# Patient Record
Sex: FEMALE | Race: WHITE | NOT HISPANIC OR LATINO | Employment: OTHER | URBAN - METROPOLITAN AREA
[De-identification: names, ages, dates, MRNs, and addresses within clinical notes are randomized per-mention and may not be internally consistent; named-entity substitution may affect disease eponyms.]

---

## 2017-02-24 ENCOUNTER — APPOINTMENT (OUTPATIENT)
Dept: PREADMISSION TESTING | Facility: HOSPITAL | Age: 67
End: 2017-02-24

## 2017-02-24 VITALS
WEIGHT: 190.6 LBS | OXYGEN SATURATION: 95 % | HEIGHT: 61 IN | BODY MASS INDEX: 35.98 KG/M2 | RESPIRATION RATE: 16 BRPM | HEART RATE: 76 BPM | DIASTOLIC BLOOD PRESSURE: 76 MMHG | SYSTOLIC BLOOD PRESSURE: 170 MMHG

## 2017-02-24 LAB
ANION GAP SERPL CALCULATED.3IONS-SCNC: 17.3 MMOL/L
BACTERIA UR QL AUTO: ABNORMAL /HPF
BILIRUB UR QL STRIP: NEGATIVE
BUN BLD-MCNC: 22 MG/DL (ref 8–23)
BUN/CREAT SERPL: 21 (ref 7–25)
CALCIUM SPEC-SCNC: 9.2 MG/DL (ref 8.8–10.5)
CHLORIDE SERPL-SCNC: 95 MMOL/L (ref 98–107)
CLARITY UR: CLEAR
CO2 SERPL-SCNC: 24.7 MMOL/L (ref 22–29)
COLOR UR: YELLOW
CREAT BLD-MCNC: 1.05 MG/DL (ref 0.57–1)
DEPRECATED RDW RBC AUTO: 45.2 FL (ref 37–54)
ERYTHROCYTE [DISTWIDTH] IN BLOOD BY AUTOMATED COUNT: 15.3 % (ref 11.5–14.5)
GFR SERPL CREATININE-BSD FRML MDRD: 52 ML/MIN/1.73
GLUCOSE BLD-MCNC: 219 MG/DL (ref 65–99)
GLUCOSE UR STRIP-MCNC: NEGATIVE MG/DL
HCT VFR BLD AUTO: 39 % (ref 37–47)
HGB BLD-MCNC: 11.7 G/DL (ref 12–16)
HGB UR QL STRIP.AUTO: ABNORMAL
HYALINE CASTS UR QL AUTO: ABNORMAL /LPF
KETONES UR QL STRIP: NEGATIVE
LEUKOCYTE ESTERASE UR QL STRIP.AUTO: ABNORMAL
MCH RBC QN AUTO: 24.5 PG (ref 27–31)
MCHC RBC AUTO-ENTMCNC: 30 G/DL (ref 31–37)
MCV RBC AUTO: 81.8 FL (ref 81–99)
NITRITE UR QL STRIP: NEGATIVE
PH UR STRIP.AUTO: 6 [PH] (ref 4.5–8)
PLATELET # BLD AUTO: 316 10*3/MM3 (ref 140–500)
PMV BLD AUTO: 9.7 FL (ref 7.4–10.4)
POTASSIUM BLD-SCNC: 4.6 MMOL/L (ref 3.5–5.2)
PROT UR QL STRIP: NEGATIVE
RBC # BLD AUTO: 4.77 10*6/MM3 (ref 4.2–5.4)
RBC # UR: ABNORMAL /HPF
REF LAB TEST METHOD: ABNORMAL
SODIUM BLD-SCNC: 137 MMOL/L (ref 136–145)
SP GR UR STRIP: 1.01 (ref 1–1.03)
SQUAMOUS #/AREA URNS HPF: ABNORMAL /HPF
UROBILINOGEN UR QL STRIP: ABNORMAL
WBC NRBC COR # BLD: 16.14 10*3/MM3 (ref 4.8–10.8)
WBC UR QL AUTO: ABNORMAL /HPF

## 2017-02-24 PROCEDURE — 81001 URINALYSIS AUTO W/SCOPE: CPT | Performed by: UROLOGY

## 2017-02-24 PROCEDURE — 87086 URINE CULTURE/COLONY COUNT: CPT | Performed by: UROLOGY

## 2017-02-24 PROCEDURE — 93010 ELECTROCARDIOGRAM REPORT: CPT | Performed by: INTERNAL MEDICINE

## 2017-02-24 PROCEDURE — 36415 COLL VENOUS BLD VENIPUNCTURE: CPT

## 2017-02-24 PROCEDURE — 85027 COMPLETE CBC AUTOMATED: CPT | Performed by: UROLOGY

## 2017-02-24 PROCEDURE — 93005 ELECTROCARDIOGRAM TRACING: CPT

## 2017-02-24 PROCEDURE — 80048 BASIC METABOLIC PNL TOTAL CA: CPT | Performed by: UROLOGY

## 2017-02-24 RX ORDER — CLOPIDOGREL BISULFATE 75 MG/1
75 TABLET ORAL DAILY
COMMUNITY

## 2017-02-24 NOTE — DISCHARGE INSTRUCTIONS
PRE-ADMISSION TESTING INSTRUCTIONS FOR ADULTS    Take your Metoprolol the morning of surgery with a small sip of water.    Stop your aspirin and plavix per Dr Dyson's instructions.  No advil, aleve, ibuprofen, naproxen, diet pills, decongestants, or herbal/vitamins for a week prior to surgery.    General Instructions:    • Do not eat or drink after midnight: includes water, mints, or gum. You may brush your teeth.  • Do not smoke, chew tobacco, or drink alcohol.  • Bring medications in original bottles, any inhalers and if applicable your C-PAP/ BI-PAP machine.  • Wear clean comfortable clothes and socks.  • Do not wear contact lenses, lotion, deodorant, or make-up.  Bring a case for your glasses if applicable.   • Bring crutches or walker if applicable.  • Leave all other valuables and jewelry at home.      Preventing a Surgical Site Infection:    • Shower the night before and on the morning of surgery using the chlorhexidine soap you were given.  Use a clean washcloth with the soap. Do not use the CHG soap on your hair, face, or private areas. Wash your body gently for five (5) minutes. Do not scrub your skin too hard.  Dry with a clean towel and dress in clean clothing.    • Do not shave the surgical area for a week prior to surgery  because the razor can irritate skin and make it easier to develop an infection.    • Make sure you, your family, and all healthcare providers clean their hands with soap and water or an alcohol based hand  before caring for you or your wound.    • If at all possible, quit smoking as many days before surgery as you can.    Day of surgery:    Your surgeon’s office will advise you of your arrival time for the day of surgery.    Upon arrival, a Pre-op nurse and Anesthesia provider will review your health history, obtain vital signs, and answer questions you may have.  The only belongings needed at this time will be your home medications and if applicable your C-PAP/BI-PAP  machine.  If you are staying overnight your family can leave the rest of your belongings in the car and bring them to your room later.  A Pre-op nurse will start an IV and you may receive medication in preparation for surgery, including something to help you relax.  Your family will be able to see you in the Pre-op area.  While you are in surgery your family should notify the waiting room  if they leave the waiting room area and provide a contact phone number.    IF you have any questions, you can call the Pre-Admission Department at [phone] or your surgeon's office.    Please be aware that surgery does come with discomfort.  We want to make every effort to control your discomfort so please discuss any uncontrolled symptoms with your nurse.   Your doctor will most likely have prescribed pain medications.      If you are going home after surgery you will receive individualized written care instructions before being discharged.  A responsible adult must drive you to and from the hospital on the day of your surgery and stay with you for 24 hours.    If you are staying overnight following surgery, you will be transported to your hospital room following the recovery period.  [facility] has all private rooms.    Deductibles and co-payments are collected on the day of service. Please be prepared to pay the required co-pay, deductible or deposit on the day of service as defined by your plan.      Transurethral Resection, Bladder Tumor  A cancerous growth (tumor) can develop on the inside wall of the bladder. The bladder is the organ that holds urine. One way to remove the tumor is a procedure called a transurethral resection. The tumor is removed (resected) through the tube that carries urine from the bladder out of the body (urethra). No cuts (incisions) are made in the skin. Instead, the procedure is done through a thin telescope, called a resectoscope. Attached to it is a light and usually  a tiny camera. The resectoscope is put into the urethra. In men, the urethra opens at the end of the penis. In women, it opens just above the vagina.   A transurethral resection is usually used to remove tumors that have not gotten too big or too deep. These are called Stage 0, Stage 1 or Stage 2 bladder cancers.  LET YOUR CAREGIVER KNOW ABOUT:   On the day of the procedure, your caregivers will need to know the last time you had anything to eat or drink. This includes water, gum, and candy. In advance, make sure they know about:   · Any allergies.  · All medications you are taking, including:    Herbs, eyedrops, over-the-counter medications and creams.    Blood thinners (anticoagulants), aspirin or other drugs that could affect blood clotting.  · Use of steroids (by mouth or as creams).  · Previous problems with anesthetics, including local anesthetics.  · Possibility of pregnancy, if this applies.  · Any history of blood clots.  · Any history of bleeding or other blood problems.  · Previous surgery.  · Smoking history.  · Any recent symptoms of colds or infections.  · Other health problems.  RISKS AND COMPLICATIONS  This is usually a safe procedure. Every procedure has risks, though. For a transurethral resection, they include:  · Infection. Antibiotic medication would need to be taken.  · Bleeding.    Light bleeding may last for several days after the procedure.    If bleeding continues or is heavy, the bladder may need rinsing. Or, a new catheter might be put in for awhile.    Sometimes bed rest is needed.  · Urination problems.    Pain and burning can occur when urinating. This usually goes away in a few days.    Scarring from the procedure can block the flow of urine.  · Bladder damage.    It can be punctured or torn during removal of the tumor. If this happens, a catheter might be needed for longer. Antibiotics would be taken while the bladder heals.    Urine can leak through the hole or tear into the  abdomen. If this happens, surgery may be needed to repair the bladder.  BEFORE THE PROCEDURE   · A medical evaluation will be done. This may include:    A physical examination.    Urine test. This is to make sure you do not have a urinary tract infection.    Blood tests.    A test that checks the heart's rhythm (electrocardiogram).    Talking with an anesthesiologist. This is the person who will be in charge of the medication (anesthesia) to keep you from feeling pain during the transurethral resection. You might be asleep during the procedure (general anesthesia) or numb from the waist down, but awake during the procedure (spinal anesthesia). Ask your surgeon what to expect.  · The person who is having a transurethral resection needs to give what is called informed consent. This requires signing a legal paper that gives permission for the procedure. To give informed consent:    You must understand how the procedure is done and why.    You must be told all the risks and benefits of the procedure.    You must sign the consent. Sometimes a legal guardian can do this.    Signing should be witnessed by a healthcare professional.  · The day before the surgery, eat only a light dinner. Then, do not eat or drink anything for at least 8 hours before the surgery. Ask your caregiver if it is OK to take any needed medicines with a sip of water.  · Arrive at least an hour before the surgery or whenever your surgeon recommends. This will give you time to check in and fill out any needed paperwork.  PROCEDURE  · The preparation:    You will change into a hospital gown.    A needle will be inserted in your arm. This is an intravenous access tube (IV). Medication will be able to flow directly into your body through this needle.    Small monitors will be put on your body. They are used to check your heart, blood pressure, and oxygen level.    You might be given medication that will help you relax (sedative).    You will be given a  general anesthetic or spinal anesthesia.  · The procedure:    Once you are asleep or numb from the waist down, your legs will be placed in stirrups.    The resectoscope will be passed through the urethra into the bladder.    Fluid will be passed through the resectoscope. This will fill the bladder with water.    The surgeon will examine the bladder through the scope. If the scope has a camera, it can take pictures from inside the bladder. They can be projected onto a TV screen.    The surgeon will use various tools to remove the tumor in small pieces. Sometimes a laser (a beam of light energy) is used. Other tools may use electric current.    A tube (catheter) will often be placed so that urine can drain into a bag outside the body. This process helps stop bleeding. This tube keeps blood clots from blocking the urethra.    The procedure usually takes 30 to 45 minutes.  AFTER THE PROCEDURE   · You will stay in a recovery area until the anesthesia has worn off. Your blood pressure and pulse will be checked every so often. Then you will be taken to a hospital room.  · You may continue to get fluids through the IV for awhile.  · Some pain is normal. The catheter might be uncomfortable. Pain is usually not severe. If it is, ask for pain medicine.  · Your urine may look bloody after a transurethral resection. This is normal.  ¨ If bleeding is heavy, a hospital caregiver may rinse out the bladder (irrigation) through the catheter.  ¨ Once the urine is clear, the catheter will be taken out.  ¨ You will need to stay in the hospital until you can urinate on your own.  · This can be an outpatient or inpatient procedure. Discuss this with your physician.  PROGNOSIS   · Transurethral resection is considered the best way to treat bladder tumors that are not too far along. For most people, the treatment is successful. Sometimes, though, more treatment is needed.  · Bladder cancers can come back even after a successful procedure.  Because of this, be sure to have a checkup with your caregiver every 3 to 6 months. If everything is OK for 3 years, you can reduce the checkups to once a year.     This information is not intended to replace advice given to you by your health care provider. Make sure you discuss any questions you have with your health care provider.     Document Released: 10/14/2010 Document Revised: 03/11/2013 Document Reviewed: 01/27/2015  [employer]® Patient Information ©2016 [employer].

## 2017-02-24 NOTE — PAT
Pt and daughter here for PAT visit.  Pre-op tests completed, chg soap given, and instructions reviewed.  Will need accu-check and potassium day of surgery. Follows w/Dr Marrero for cardiology, clearance for TURB in 6/2016.

## 2017-02-26 LAB — BACTERIA SPEC AEROBE CULT: ABNORMAL

## 2017-04-04 ENCOUNTER — LAB (OUTPATIENT)
Dept: LAB | Facility: HOSPITAL | Age: 67
End: 2017-04-04
Attending: UROLOGY

## 2017-04-04 ENCOUNTER — TRANSCRIBE ORDERS (OUTPATIENT)
Dept: ADMINISTRATIVE | Facility: HOSPITAL | Age: 67
End: 2017-04-04

## 2017-04-04 DIAGNOSIS — D49.4 BLADDER TUMOR: ICD-10-CM

## 2017-04-04 DIAGNOSIS — Z01.818 PRE-OP TESTING: ICD-10-CM

## 2017-04-04 DIAGNOSIS — Z01.818 PRE-OP TESTING: Primary | ICD-10-CM

## 2017-04-04 LAB
BACTERIA UR QL AUTO: ABNORMAL /HPF
BILIRUB UR QL STRIP: NEGATIVE
CLARITY UR: CLEAR
COLOR UR: YELLOW
GLUCOSE UR STRIP-MCNC: NEGATIVE MG/DL
HGB UR QL STRIP.AUTO: ABNORMAL
HYALINE CASTS UR QL AUTO: ABNORMAL /LPF
KETONES UR QL STRIP: NEGATIVE
LEUKOCYTE ESTERASE UR QL STRIP.AUTO: ABNORMAL
NITRITE UR QL STRIP: NEGATIVE
PH UR STRIP.AUTO: 6.5 [PH] (ref 4.5–8)
PROT UR QL STRIP: NEGATIVE
RBC # UR: ABNORMAL /HPF
REF LAB TEST METHOD: ABNORMAL
SP GR UR STRIP: 1.01 (ref 1–1.03)
SQUAMOUS #/AREA URNS HPF: ABNORMAL /HPF
UROBILINOGEN UR QL STRIP: ABNORMAL
WBC UR QL AUTO: ABNORMAL /HPF

## 2017-04-04 PROCEDURE — 81001 URINALYSIS AUTO W/SCOPE: CPT

## 2017-04-07 NOTE — PAT
History updated by phone.  Will need labs dos per anesthesia guidelines.  States has rash that comes intermittently, low grade fever with it;  PCP told ?shingles or scabies, pt trying to get in with dermatology.  Instructed to let Dr Dyson know if has a fever. Reviewed w/CW, CRNA; not a concern for surgery.

## 2017-04-13 ENCOUNTER — ANESTHESIA EVENT (OUTPATIENT)
Dept: PERIOP | Facility: HOSPITAL | Age: 67
End: 2017-04-13

## 2017-04-14 ENCOUNTER — ANESTHESIA (OUTPATIENT)
Dept: PERIOP | Facility: HOSPITAL | Age: 67
End: 2017-04-14

## 2017-04-14 ENCOUNTER — HOSPITAL ENCOUNTER (OUTPATIENT)
Facility: HOSPITAL | Age: 67
Setting detail: HOSPITAL OUTPATIENT SURGERY
Discharge: HOME | End: 2017-04-14
Attending: UROLOGY | Admitting: UROLOGY

## 2017-04-14 VITALS
OXYGEN SATURATION: 98 % | HEART RATE: 60 BPM | TEMPERATURE: 98 F | DIASTOLIC BLOOD PRESSURE: 90 MMHG | WEIGHT: 190 LBS | RESPIRATION RATE: 15 BRPM | BODY MASS INDEX: 34.96 KG/M2 | SYSTOLIC BLOOD PRESSURE: 185 MMHG | HEIGHT: 62 IN

## 2017-04-14 DIAGNOSIS — C67.4 CANCER OF POSTERIOR WALL OF URINARY BLADDER (HCC): ICD-10-CM

## 2017-04-14 PROBLEM — C67.9 BLADDER CANCER: Status: ACTIVE | Noted: 2017-04-14

## 2017-04-14 LAB
BACTERIA UR QL AUTO: ABNORMAL /HPF
BILIRUB UR QL STRIP: NEGATIVE
CLARITY UR: CLEAR
COLOR UR: YELLOW
GLUCOSE BLDC GLUCOMTR-MCNC: 74 MG/DL (ref 70–130)
GLUCOSE BLDC GLUCOMTR-MCNC: 77 MG/DL (ref 70–130)
GLUCOSE UR STRIP-MCNC: NEGATIVE MG/DL
HGB UR QL STRIP.AUTO: ABNORMAL
HYALINE CASTS UR QL AUTO: ABNORMAL /LPF
KETONES UR QL STRIP: NEGATIVE
LEUKOCYTE ESTERASE UR QL STRIP.AUTO: ABNORMAL
NITRITE UR QL STRIP: NEGATIVE
PH UR STRIP.AUTO: 7 [PH] (ref 4.5–8)
POTASSIUM BLD-SCNC: 4.1 MMOL/L (ref 3.5–5.2)
PROT UR QL STRIP: NEGATIVE
RBC # UR: ABNORMAL /HPF
REF LAB TEST METHOD: ABNORMAL
SP GR UR STRIP: 1.01 (ref 1–1.03)
SQUAMOUS #/AREA URNS HPF: ABNORMAL /HPF
UROBILINOGEN UR QL STRIP: ABNORMAL
WBC UR QL AUTO: ABNORMAL /HPF

## 2017-04-14 PROCEDURE — 25010000002 FENTANYL CITRATE (PF) 100 MCG/2ML SOLUTION: Performed by: NURSE ANESTHETIST, CERTIFIED REGISTERED

## 2017-04-14 PROCEDURE — 25010000002 ONDANSETRON PER 1 MG: Performed by: NURSE ANESTHETIST, CERTIFIED REGISTERED

## 2017-04-14 PROCEDURE — 81001 URINALYSIS AUTO W/SCOPE: CPT | Performed by: UROLOGY

## 2017-04-14 PROCEDURE — 87086 URINE CULTURE/COLONY COUNT: CPT | Performed by: UROLOGY

## 2017-04-14 PROCEDURE — 82962 GLUCOSE BLOOD TEST: CPT

## 2017-04-14 PROCEDURE — 25010000002 PROPOFOL 10 MG/ML EMULSION: Performed by: NURSE ANESTHETIST, CERTIFIED REGISTERED

## 2017-04-14 PROCEDURE — 84132 ASSAY OF SERUM POTASSIUM: CPT | Performed by: NURSE ANESTHETIST, CERTIFIED REGISTERED

## 2017-04-14 PROCEDURE — 25010000002 MIDAZOLAM PER 1 MG: Performed by: NURSE ANESTHETIST, CERTIFIED REGISTERED

## 2017-04-14 PROCEDURE — 25010000003 CEFAZOLIN PER 500 MG

## 2017-04-14 RX ORDER — LIDOCAINE HYDROCHLORIDE 20 MG/ML
INJECTION, SOLUTION INFILTRATION; PERINEURAL AS NEEDED
Status: DISCONTINUED | OUTPATIENT
Start: 2017-04-14 | End: 2017-04-14 | Stop reason: SURG

## 2017-04-14 RX ORDER — SODIUM CHLORIDE, SODIUM LACTATE, POTASSIUM CHLORIDE, CALCIUM CHLORIDE 600; 310; 30; 20 MG/100ML; MG/100ML; MG/100ML; MG/100ML
9 INJECTION, SOLUTION INTRAVENOUS CONTINUOUS
Status: DISCONTINUED | OUTPATIENT
Start: 2017-04-14 | End: 2017-04-14 | Stop reason: HOSPADM

## 2017-04-14 RX ORDER — SODIUM CHLORIDE 0.9 % (FLUSH) 0.9 %
3 SYRINGE (ML) INJECTION AS NEEDED
Status: DISCONTINUED | OUTPATIENT
Start: 2017-04-14 | End: 2017-04-14 | Stop reason: HOSPADM

## 2017-04-14 RX ORDER — MIDAZOLAM HYDROCHLORIDE 1 MG/ML
0.5 INJECTION INTRAMUSCULAR; INTRAVENOUS
Status: DISCONTINUED | OUTPATIENT
Start: 2017-04-14 | End: 2017-04-14 | Stop reason: HOSPADM

## 2017-04-14 RX ORDER — SODIUM CHLORIDE 0.9 % (FLUSH) 0.9 %
1-10 SYRINGE (ML) INJECTION AS NEEDED
Status: DISCONTINUED | OUTPATIENT
Start: 2017-04-14 | End: 2017-04-14 | Stop reason: HOSPADM

## 2017-04-14 RX ORDER — MIDAZOLAM HYDROCHLORIDE 1 MG/ML
2 INJECTION INTRAMUSCULAR; INTRAVENOUS
Status: DISCONTINUED | OUTPATIENT
Start: 2017-04-14 | End: 2017-04-14 | Stop reason: HOSPADM

## 2017-04-14 RX ORDER — ONDANSETRON 2 MG/ML
4 INJECTION INTRAMUSCULAR; INTRAVENOUS ONCE AS NEEDED
Status: COMPLETED | OUTPATIENT
Start: 2017-04-14 | End: 2017-04-14

## 2017-04-14 RX ORDER — PHENAZOPYRIDINE HYDROCHLORIDE 100 MG/1
200 TABLET, FILM COATED ORAL ONCE
Status: COMPLETED | OUTPATIENT
Start: 2017-04-14 | End: 2017-04-14

## 2017-04-14 RX ORDER — SODIUM CHLORIDE, SODIUM LACTATE, POTASSIUM CHLORIDE, CALCIUM CHLORIDE 600; 310; 30; 20 MG/100ML; MG/100ML; MG/100ML; MG/100ML
1000 INJECTION, SOLUTION INTRAVENOUS CONTINUOUS PRN
Status: DISCONTINUED | OUTPATIENT
Start: 2017-04-14 | End: 2017-04-14 | Stop reason: HOSPADM

## 2017-04-14 RX ORDER — HYDROCODONE BITARTRATE AND ACETAMINOPHEN 7.5; 325 MG/1; MG/1
1 TABLET ORAL ONCE AS NEEDED
Status: DISCONTINUED | OUTPATIENT
Start: 2017-04-14 | End: 2017-04-14 | Stop reason: HOSPADM

## 2017-04-14 RX ORDER — MEPERIDINE HYDROCHLORIDE 25 MG/ML
12.5 INJECTION INTRAMUSCULAR; INTRAVENOUS; SUBCUTANEOUS
Status: DISCONTINUED | OUTPATIENT
Start: 2017-04-14 | End: 2017-04-14 | Stop reason: HOSPADM

## 2017-04-14 RX ORDER — MAGNESIUM HYDROXIDE 1200 MG/15ML
LIQUID ORAL AS NEEDED
Status: DISCONTINUED | OUTPATIENT
Start: 2017-04-14 | End: 2017-04-14 | Stop reason: HOSPADM

## 2017-04-14 RX ORDER — ONDANSETRON 2 MG/ML
4 INJECTION INTRAMUSCULAR; INTRAVENOUS ONCE AS NEEDED
Status: DISCONTINUED | OUTPATIENT
Start: 2017-04-14 | End: 2017-04-14 | Stop reason: HOSPADM

## 2017-04-14 RX ORDER — LIDOCAINE HYDROCHLORIDE 10 MG/ML
0.5 INJECTION, SOLUTION INFILTRATION; PERINEURAL ONCE AS NEEDED
Status: COMPLETED | OUTPATIENT
Start: 2017-04-14 | End: 2017-04-14

## 2017-04-14 RX ORDER — PROPOFOL 10 MG/ML
VIAL (ML) INTRAVENOUS AS NEEDED
Status: DISCONTINUED | OUTPATIENT
Start: 2017-04-14 | End: 2017-04-14 | Stop reason: SURG

## 2017-04-14 RX ORDER — FAMOTIDINE 20 MG/1
20 TABLET, FILM COATED ORAL
Status: DISCONTINUED | OUTPATIENT
Start: 2017-04-14 | End: 2017-04-14 | Stop reason: HOSPADM

## 2017-04-14 RX ORDER — FENTANYL CITRATE 50 UG/ML
INJECTION, SOLUTION INTRAMUSCULAR; INTRAVENOUS AS NEEDED
Status: DISCONTINUED | OUTPATIENT
Start: 2017-04-14 | End: 2017-04-14 | Stop reason: SURG

## 2017-04-14 RX ORDER — FAMOTIDINE 10 MG/ML
20 INJECTION, SOLUTION INTRAVENOUS
Status: DISCONTINUED | OUTPATIENT
Start: 2017-04-14 | End: 2017-04-14 | Stop reason: HOSPADM

## 2017-04-14 RX ORDER — LIDOCAINE HYDROCHLORIDE 10 MG/ML
0.3 INJECTION, SOLUTION EPIDURAL; INFILTRATION; INTRACAUDAL; PERINEURAL ONCE
Status: COMPLETED | OUTPATIENT
Start: 2017-04-14 | End: 2017-04-14

## 2017-04-14 RX ADMIN — PROPOFOL 100 MG: 10 INJECTION, EMULSION INTRAVENOUS at 07:40

## 2017-04-14 RX ADMIN — LIDOCAINE HYDROCHLORIDE 100 MG: 20 INJECTION, SOLUTION INFILTRATION; PERINEURAL at 07:40

## 2017-04-14 RX ADMIN — LIDOCAINE HYDROCHLORIDE 0.5 ML: 10 INJECTION, SOLUTION EPIDURAL; INFILTRATION; INTRACAUDAL; PERINEURAL at 07:29

## 2017-04-14 RX ADMIN — FAMOTIDINE 20 MG: 10 INJECTION, SOLUTION INTRAVENOUS at 07:18

## 2017-04-14 RX ADMIN — HYDROCODONE BITARTRATE AND ACETAMINOPHEN 1 TABLET: 7.5; 325 TABLET ORAL at 09:05

## 2017-04-14 RX ADMIN — FENTANYL CITRATE 50 MCG: 50 INJECTION, SOLUTION INTRAMUSCULAR; INTRAVENOUS at 07:40

## 2017-04-14 RX ADMIN — ONDANSETRON 4 MG: 2 INJECTION, SOLUTION INTRAMUSCULAR; INTRAVENOUS at 07:18

## 2017-04-14 RX ADMIN — PHENAZOPYRIDINE HYDROCHLORIDE 200 MG: 100 TABLET ORAL at 08:51

## 2017-04-14 RX ADMIN — LIDOCAINE HYDROCHLORIDE 0.3 ML: 10 INJECTION, SOLUTION EPIDURAL; INFILTRATION; INTRACAUDAL; PERINEURAL at 06:20

## 2017-04-14 RX ADMIN — MIDAZOLAM HYDROCHLORIDE 0.5 MG: 1 INJECTION, SOLUTION INTRAMUSCULAR; INTRAVENOUS at 07:24

## 2017-04-14 RX ADMIN — SODIUM CHLORIDE, POTASSIUM CHLORIDE, SODIUM LACTATE AND CALCIUM CHLORIDE 9 ML/HR: 600; 310; 30; 20 INJECTION, SOLUTION INTRAVENOUS at 06:20

## 2017-04-14 RX ADMIN — CEFAZOLIN SODIUM 2 G: 2 SOLUTION INTRAVENOUS at 07:39

## 2017-04-14 NOTE — PLAN OF CARE
Problem: Patient Care Overview (Adult)  Goal: Plan of Care Review  Outcome: Ongoing (interventions implemented as appropriate)    04/14/17 0903   Coping/Psychosocial Response Interventions   Plan Of Care Reviewed With patient   Patient Care Overview   Progress improving   Outcome Evaluation   Outcome Summary/Follow up Plan vss, waiting to go home

## 2017-04-14 NOTE — BRIEF OP NOTE
CYSTOSCOPY TRANSURETHRAL RESECTION OF BLADDER TUMOR  Procedure Note    Arianna Smith  4/14/2017    Pre-op Diagnosis: recurrent bladder cancer post mitomycin c    C67.4    Post-op Diagnosis:   Same   * No Diagnosis Codes entered *POSTERIOR WALL TRIGONE     Procedure/CPT® Codes:      Procedure(s):  CYSTOSCOPY TRANSURETHRAL RESECTION OF BLADDER TUMOR SMALL     Surgeon(s):  Seun Dyson MD    Anesthesia: General    Staff:   Circulator: Asia Moy RN  Scrub Person: Nani Ayala    Estimated Blood Loss: * No values recorded between 4/14/2017 12:00 AM and 4/14/2017  7:29 AM *  Urine Voided: * No values recorded between 4/14/2017 12:00 AM and 4/14/2017  7:29 AM *    Specimens:                * No specimens in log *      Drains:           Findings: BETTER  POST WALL  CALCIFIED POST [facility]   SUPERFICIAL TRIGONE     Complications:      Seun Dyson MD     Date: 4/14/2017  Time: 7:29 AM

## 2017-04-14 NOTE — PLAN OF CARE
Problem: Perioperative Period (Adult)  Goal: Signs and Symptoms of Listed Potential Problems Will be Absent or Manageable (Perioperative Period)  Outcome: Ongoing (interventions implemented as appropriate)    04/14/17 0903   Perioperative Period   Problems Assessed (Perioperative Period) all   Problems Present (Perioperative Period) pain

## 2017-04-14 NOTE — H&P
HISTORY OF PRESENT ILLNESS: The patient has a history of recurrent bladder cancer post mitomycin-C x3, to undergo cystoscopy and bladder biopsies. She has had no gross hematuria or irritative bladder complaints. She has a chronic cystocele that is causing no symptoms and has been followed conservatively.      HOME MEDICATIONS:   1.  Aspirin.    2.  Furosemide.    3.  Losartan.    4.  Metformin.    5.  Metoprolol.    6.  Novolin.    7.  Plavix which had been stopped preoperatively.    8.  Spironolactone.      ALLERGIES:   1.  SURGERY TAPE.     2.  TERRAMYCIN.     3.  PERCOCET.    4.  STATINS.     5.  IODINE.      PAST MEDICAL HISTORY:   1.  Arthritis.   2.  COPD.    3.  Coronary artery disease.    4.  Degenerative disk disease.    5.  Depression.    6.  Diabetes.    7.  Hiatal hernia.    8.  Hypercholesterolemia.    9.  Asthma.    10.  Bronchitis.     PAST SURGICAL HISTORY:   1.  Carotid endarterectomy on the right in .    2.   x2.   3.  Coronary artery stent on 2014, LAD, proximal.    4.  Hysterectomy.   5.  TUR bladder tumor x2.     SOCIAL HISTORY: She is . Nonsmoker. No ethanol intake. She quit smoking 20 years ago.     REVIEW OF SYSTEMS: She has had no flank pain, fever, chills, weight loss, bone pain, shortness of breath, or chest pain.      PHYSICAL EXAMINATION:  VITAL SIGNS: Weight 185, height 61 inches. Vital signs are stable.   NECK: Supple without masses, thyromegaly, JVD, bruits, or adenopathy.   LUNGS: Clear to auscultation.   CARDIOVASCULAR: Regular rate and rhythm. S1, S2. No murmurs, gallops, or rubs.   ABDOMEN: Protuberant. Soft. Nontender. Active bowel sounds without masses.   GENITOURINARY: Mildly atrophic vulva with a mild cystocele.   No POP. No stress incontinence.    EXTREMITIES: No clubbing, cyanosis, or edema.     IMPRESSION:   1.  Recurrent bladder cancer, post-mitomycin-C, for bladder biopsy.    2.  Multiple drug allergies. See above.    3.  Medications: See above.     4.  Medical illnesses: See above.     RECOMMENDATIONS: As mentioned, understood and agreed to proceed.         EMILIANO Alex:opal  D:  04/13/2017 17:41:22  T:  04/13/2017 19:21:17   Job ID:  [id number]   Document ID: [id number]  cc:   (nahomi)

## 2017-04-14 NOTE — ANESTHESIA PREPROCEDURE EVALUATION
Anesthesia Evaluation     Patient summary reviewed and Nursing notes reviewed   NPO Status: > 8 hours   Airway   Mallampati: II  TM distance: >3 FB  Neck ROM: limited  no difficulty expected  Dental    (+) edentulous    Pulmonary - normal exam    breath sounds clear to auscultation  (+) a smoker (quit in 1991) Former, COPD,   Cardiovascular     Rhythm: regular  Rate: normal    (+) hypertension, CAD, cardiac stents more than 12 months ago       Neuro/Psych  GI/Hepatic/Renal/Endo    (+)  hiatal hernia, diabetes mellitus,     Musculoskeletal     Abdominal    Substance History      OB/GYN          Other   (+) arthritis                                 Anesthesia Plan    ASA 3     general     intravenous induction   Anesthetic plan and risks discussed with patient.

## 2017-04-14 NOTE — PLAN OF CARE
Problem: Patient Care Overview (Adult)  Goal: Plan of Care Review  Outcome: Ongoing (interventions implemented as appropriate)    04/14/17 0610   Coping/Psychosocial Response Interventions   Plan Of Care Reviewed With patient;daughter   Patient Care Overview   Progress no change   Outcome Evaluation   Outcome Summary/Follow up Plan stable       Goal: Adult Individualization and Mutuality  Outcome: Ongoing (interventions implemented as appropriate)  Goal: Discharge Needs Assessment  Outcome: Ongoing (interventions implemented as appropriate)    Problem: Perioperative Period (Adult)  Goal: Signs and Symptoms of Listed Potential Problems Will be Absent or Manageable (Perioperative Period)  Outcome: Ongoing (interventions implemented as appropriate)

## 2017-04-14 NOTE — OP NOTE
DATE OF PROCEDURE:  04/14/2017       PREOPERATIVE DIAGNOSIS: Recurrent bladder cancer, post mitomycin-C treatment, for evaluation.     POSTOPERATIVE DIAGNOSES:   1.  Calcification along the posterior bladder wall from previous resection, previous tumor.   2.  Superficial tumor along the left hemitrigone just above the left ureteral orifice.     PROCEDURES PERFORMED:   1.  Cystoscopy with transurethral resection of small bladder tumor and cauterization of this area. 2.  Cautery of a small superficial tumor along the left hemitrigone, away from the left ureteral orifice.     SURGEON: PHYLLIS Dyson MD     ANESTHESIA: General.     INDICATIONS FOR PROCEDURE: This is a 66-year-old white female with a history of recurrent bladder cancer, post mitomycin-C treatments, for repeat evaluation. She understands proceed. Kefzol 2 g was given. Timeout was taken.     ALLERGIES: PERCOCET, tolerating hydrocodone past.     DESCRIPTION OF PROCEDURE:  After adequate general anesthesia, she was placed very carefully in a modified dorsal lithotomy position. All pressure points relieved.  She was prepped and draped in sterile fashion of genitalia. Intraurethral 2% Xylocaine jelly was administered. A # 21-Brazilian cystoscope sheath was placed in the bladder. Trigone showed some very superficial small areas of bladder tumor on the left hemitrigone. Orifices were small with clear efflux bilaterally. The posterior bladder wall showed the areas of calcification and some erythema. No discrete tumor was seen. The remainder of the bladder was visualized and was normal. The bladder neck was closed. She had some mild vulvar atrophy with good pelvic organ support.     Subsequently, a 26-Brazilian resectoscope sheath was placed in the bladder. Resection was carried down with a loop along the posterior bladder wall and part of the left hemitrigone avoiding  the left ureteral orifice. These areas were then cauterized and specimens collected and sent  to pathology. The remainder of the areas were controlled via rollerball electrode cautery. Once verified over our previous resection areas and along some superficial areas along the hemitrigone away from the ureteral orifice, but close to, these areas were then cauterized. Hemostasis checked and verified and normotensive and once verified, with use of sterile water, this was copiously irrigated and then the remainder of Xylocaine jelly was instilled per urethra. The patient tolerated the procedure well. The findings were discussed with her daughter.  She was sent to the recovery room in satisfactory condition.     DISPOSITION: Continuation of her preop medications, diet and activities except for the aspirin.  She will remain off until her urine is clear for 48 hours.     DISCHARGE MEDICATIONS:   1.  Norco 5 mg, #10.  2.  Phenergan 25 mg, #10.  3.  Pyridium for bladder irritation.    FOLLOWUP: Instructions and phone numbers to make appointment to see me in the office in 2 weeks.       EMILIANO Alex  D:  04/14/2017 08:18:54   T:  04/14/2017 08:57:58   Job ID:  [id number]   Document ID:  [id number]  cc:

## 2017-04-14 NOTE — ANESTHESIA PROCEDURE NOTES
Airway    General Information and Staff    Patient location during procedure: OR  CRNA: NANI TANNER    Indications and Patient Condition    Preoxygenated: yes  MILS maintained throughout      Final Airway Details  Final airway type: supraglottic airway      Successful airway: unique  Size 4    Number of attempts at approach: 1

## 2017-04-14 NOTE — PLAN OF CARE
Problem: Patient Care Overview (Adult)  Goal: Adult Individualization and Mutuality  Outcome: Ongoing (interventions implemented as appropriate)    04/14/17 0903   Individualization   Patient Specific Preferences none

## 2017-04-15 LAB — BACTERIA SPEC AEROBE CULT: NORMAL

## 2017-04-17 LAB
LAB AP CASE REPORT: NORMAL
Lab: NORMAL
PATH REPORT.FINAL DX SPEC: NORMAL

## 2019-11-14 NOTE — ANESTHESIA POSTPROCEDURE EVALUATION
Patient: Arianna Smith    Procedure Summary     Date Anesthesia Start Anesthesia Stop Room / Location    04/14/17 0730 0820 [facility] / [facility]       Procedure Diagnosis Surgeon Provider    CYSTOSCOPY TRANSURETHRAL RESECTION OF BLADDER TUMOR (N/A ) (C67.4) MD Amy Cunningham CRNA          Anesthesia Type: general  Last vitals  BP (!) 195/96 (04/14/17 0850)    Temp      Pulse 56 (04/14/17 0850)   Resp 16 (04/14/17 0850)    SpO2 99 % (04/14/17 0850)      Post Anesthesia Care and Evaluation    Patient location during evaluation: PHASE II  Patient participation: complete - patient participated  Level of consciousness: awake and alert  Pain score: 2  Pain management: adequate  Airway patency: patent  Anesthetic complications: No anesthetic complications  PONV Status: none  Cardiovascular status: acceptable  Respiratory status: acceptable  Hydration status: acceptable       · Patient complained of dark colored urine with suprapubic pain and tenderness  · Urinalysis does not show hematuria, or signs of infection, likely contaminated  · Currently symptoms resolved  · Started on amoxicillin 900 mg BID    Plan  1   Discontinue amoxicillin

## 2021-09-30 ENCOUNTER — OFFICE VISIT (OUTPATIENT)
Dept: CARDIOLOGY | Facility: CLINIC | Age: 71
End: 2021-09-30

## 2021-09-30 VITALS
DIASTOLIC BLOOD PRESSURE: 74 MMHG | RESPIRATION RATE: 16 BRPM | OXYGEN SATURATION: 96 % | WEIGHT: 172 LBS | HEIGHT: 59 IN | HEART RATE: 70 BPM | BODY MASS INDEX: 34.68 KG/M2 | SYSTOLIC BLOOD PRESSURE: 140 MMHG

## 2021-09-30 DIAGNOSIS — R07.2 PRECORDIAL PAIN: ICD-10-CM

## 2021-09-30 DIAGNOSIS — Z78.9 STATIN INTOLERANCE: ICD-10-CM

## 2021-09-30 DIAGNOSIS — E78.00 HIGH CHOLESTEROL: Chronic | ICD-10-CM

## 2021-09-30 DIAGNOSIS — Z95.5 PRESENCE OF DRUG COATED STENT IN LAD CORONARY ARTERY: Chronic | ICD-10-CM

## 2021-09-30 DIAGNOSIS — I25.118 CORONARY ARTERY DISEASE OF NATIVE ARTERY OF NATIVE HEART WITH STABLE ANGINA PECTORIS (HCC): ICD-10-CM

## 2021-09-30 DIAGNOSIS — R06.02 SOB (SHORTNESS OF BREATH): ICD-10-CM

## 2021-09-30 DIAGNOSIS — R00.2 PALPITATIONS: Primary | ICD-10-CM

## 2021-09-30 PROCEDURE — 93000 ELECTROCARDIOGRAM COMPLETE: CPT | Performed by: INTERNAL MEDICINE

## 2021-09-30 PROCEDURE — 99204 OFFICE O/P NEW MOD 45 MIN: CPT | Performed by: INTERNAL MEDICINE

## 2021-09-30 RX ORDER — SPIRONOLACTONE 25 MG/1
25 TABLET ORAL 2 TIMES DAILY
COMMUNITY

## 2021-09-30 RX ORDER — FUROSEMIDE 40 MG/1
40 TABLET ORAL DAILY
COMMUNITY
Start: 2021-08-27

## 2021-09-30 RX ORDER — POTASSIUM CHLORIDE 750 MG/1
10 TABLET, FILM COATED, EXTENDED RELEASE ORAL 2 TIMES DAILY
COMMUNITY
Start: 2021-09-02

## 2021-09-30 RX ORDER — METOPROLOL SUCCINATE 50 MG/1
50 TABLET, EXTENDED RELEASE ORAL DAILY
COMMUNITY
End: 2021-10-27 | Stop reason: ALTCHOICE

## 2021-09-30 RX ORDER — LOSARTAN POTASSIUM 50 MG/1
50 TABLET ORAL DAILY
COMMUNITY
Start: 2021-08-09

## 2021-09-30 RX ORDER — NITROGLYCERIN 0.4 MG/1
0.4 TABLET SUBLINGUAL
COMMUNITY
End: 2021-10-27 | Stop reason: SDUPTHER

## 2021-09-30 RX ORDER — GABAPENTIN 300 MG/1
300 CAPSULE ORAL 3 TIMES DAILY
COMMUNITY
Start: 2021-08-26

## 2021-09-30 NOTE — PROGRESS NOTES
Subjective:     Encounter Date:09/30/21      Patient ID: Arianna Smith is a 71 y.o. female.    Chief Complaint:  History of Present Illness    Dear Dr. Horowitz,    I had the pleasure of seeing this patient in the office today for initial evaluation and consultation.  I appreciate that you sent her in to see us.  They come in today to be seen for a history of coronary disease, prior stent placement.    Patient is previously followed with Dr. Cavanaugh, but it looks like 2018 was the last time that she was seen in the office.  He says over the last year they called several times but have not been able to get any response.    She has a history coronary disease.  She had drug-eluting stent placed in the LAD in February 2014.  She had nonocclusive circumflex disease, and diffuse RCA disease that was not amenable to cath-based intervention.  She has been maintained on medical therapy since.  She has not had a stress test, cath, or ischemic evaluation since 2014.  She also has a history of PAD, status post right carotid endarterectomy in 2015 with Dr. Burrows, although she last saw him in 2018.  She has a history of hypertension and severe hyperlipidemia with multiple statin intolerance.  She has a history of diabetes mellitus with circulatory complication.    She does intermittently have chest discomfort.  She takes nitroglycerin intermittently with that.  It does help with chest discomfort.  She gets dyspneic with activity.  She does get some dyspnea when she has the chest discomfort.  She has had occasional lightheadedness and has had some issues with palpitations.  Tends to notice that when she is not doing anything.  She has fairly minimal exercise tolerance and does not been able to walk on a treadmill in the past.  She has not had any orthopnea or PND but she does have some chronic lower extremity edema for which she takes diuretics daily.    She has not had any chills or fevers.  She did have Covid last year.  She  "subsequently had a Covid vaccination.    The following portions of the patient's history were reviewed and updated as appropriate: allergies, current medications, past family history, past medical history, past social history, past surgical history and problem list.      ECG 12 Lead    Date/Time: 9/30/2021 10:23 AM  Performed by: Jean-Claude Webber III, MD  Authorized by: Jean-Claude Webber III, MD   Comparison: compared with previous ECG   Similar to previous ECG  Rhythm: sinus rhythm  Rate: normal  Conduction: conduction normal  ST Depression: V4, V5, V6, I and aVL  T inversion: V4, V5, V6, I and aVL  QRS axis: normal  Other: no other findings    Clinical impression: abnormal EKG               Objective:     Vitals:    09/30/21 0934   BP: 140/74   Pulse: 70   Resp: 16   SpO2: 96%   Weight: 78 kg (172 lb)   Height: 149.9 cm (59\")     Body mass index is 34.74 kg/m².      Vitals reviewed.   Constitutional:       General: Not in acute distress.     Appearance: Well-developed. Not diaphoretic.   Eyes:      General:         Right eye: No discharge.         Left eye: No discharge.      Conjunctiva/sclera: Conjunctivae normal.      Pupils: Pupils are equal, round, and reactive to light.   HENT:      Head: Normocephalic and atraumatic.      Nose: Nose normal.   Neck:      Thyroid: No thyromegaly.      Vascular: Carotid bruit present.      Trachea: No tracheal deviation.      Lymphadenopathy: No cervical adenopathy.   Pulmonary:      Effort: Pulmonary effort is normal. No respiratory distress.      Breath sounds: Normal breath sounds. No stridor.   Chest:      Chest wall: Not tender to palpatation.   Cardiovascular:      Normal rate. Regular rhythm.      Murmurs: There is no murmur.      . No S3 gallop. No click. No rub.   Pulses:     Intact distal pulses.   Abdominal:      General: Bowel sounds are normal. There is no distension.      Palpations: Abdomen is soft. There is no abdominal mass.      Tenderness: There is no abdominal " tenderness. There is no guarding or rebound.   Musculoskeletal: Normal range of motion.         General: No tenderness or deformity.      Cervical back: Normal range of motion and neck supple. Skin:     General: Skin is warm and dry.      Findings: No erythema or rash.   Neurological:      Mental Status: Alert and oriented to person, place, and time.      Deep Tendon Reflexes: Reflexes are normal and symmetric.   Psychiatric:         Thought Content: Thought content normal.         Data and records reviewed:     Lab Results   Component Value Date    GLUCOSE 219 (H) 02/24/2017    BUN 22 02/24/2017    CREATININE 1.05 (H) 02/24/2017    EGFRIFNONA 52 (L) 02/24/2017    BCR 21.0 02/24/2017    K 4.1 04/14/2017    CO2 24.7 02/24/2017    CALCIUM 9.2 02/24/2017     No results found for: CHOL  Lab Results   Component Value Date    TRIG 240 (H) 04/19/2018     Lab Results   Component Value Date    HDL 37 (L) 04/19/2018     Lab Results   Component Value Date     (H) 04/19/2018     Lab Results   Component Value Date    VLDL 48 (H) 04/19/2018     No results found for: LDLHDL    No radiology results for the last 90 days.          Assessment:          Diagnosis Plan   1. Palpitations  Adult Transthoracic Echo Complete W/ Cont if Necessary Per Protocol    Stress Test With Myocardial Perfusion One Day    Holter Monitor - 24 Hour   2. Precordial pain  Adult Transthoracic Echo Complete W/ Cont if Necessary Per Protocol    Stress Test With Myocardial Perfusion One Day    Holter Monitor - 24 Hour   3. SOB (shortness of breath)  Adult Transthoracic Echo Complete W/ Cont if Necessary Per Protocol    Stress Test With Myocardial Perfusion One Day    Holter Monitor - 24 Hour   4. Coronary artery disease of native artery of native heart with stable angina pectoris (HCC)     5. Presence of drug coated stent in LAD coronary artery     6. Statin intolerance     7. High cholesterol            Plan:       1.  Coronary artery disease, status  post drug-eluting stent LAD 2014.  Does have a stable angina pattern, no ischemic evaluation since 2014.  Known to have diffuse RCA disease.  We will arrange for a Lexiscan Cardiolite to be obtained  2.  Dyspnea-an echocardiogram will be obtained  3.  Carotid artery disease, status post carotid endarterectomy, does have bilateral bruits.  I did discuss carotid ultrasound, but the patient is calling to get an appointment with Dr. Burrows who is her vascular surgeon will defer that to himself  4.  Statin intolerance-we will await the results on her lipids, we have called to get those results  5.  Hyperlipidemia, await lab results  6.  Palpitations, we will arrange for a Holter monitor to be placed    Thank you very much for allowing us to participate in the care of this pleasant patient.  Please don't hesitate to call if I can be of assistance in any way.      Current Outpatient Medications:   •  clopidogrel (PLAVIX) 75 MG tablet, Take 75 mg by mouth Daily., Disp: , Rfl:   •  furosemide (LASIX) 40 MG tablet, , Disp: , Rfl:   •  gabapentin (NEURONTIN) 300 MG capsule, Take 300 mg by mouth 3 (Three) Times a Day., Disp: , Rfl:   •  insulin NPH-insulin regular (humuLIN 70/30,novoLIN 70/30) (70-30) 100 UNIT/ML injection, Inject 96 Units under the skin into the appropriate area as directed., Disp: , Rfl:   •  losartan (COZAAR) 50 MG tablet, Take 50 mg by mouth Daily., Disp: , Rfl:   •  metFORMIN (GLUCOPHAGE) 500 MG tablet, Take 500 mg by mouth 2 (Two) Times a Day., Disp: , Rfl:   •  metoprolol succinate XL (TOPROL-XL) 50 MG 24 hr tablet, Take 50 mg by mouth., Disp: , Rfl:   •  nitroglycerin (NITROSTAT) 0.4 MG SL tablet, Place 0.4 mg under the tongue., Disp: , Rfl:   •  potassium chloride 10 MEQ CR tablet, Take 10 mEq by mouth 2 (Two) Times a Day., Disp: , Rfl:   •  spironolactone (ALDACTONE) 25 MG tablet, Take 25 mg by mouth., Disp: , Rfl:          Return in about 1 year (around 9/30/2022).

## 2021-10-08 ENCOUNTER — HOSPITAL ENCOUNTER (OUTPATIENT)
Dept: CARDIOLOGY | Facility: HOSPITAL | Age: 71
Discharge: HOME | End: 2021-10-08

## 2021-10-08 ENCOUNTER — HOSPITAL ENCOUNTER (OUTPATIENT)
Dept: NUCLEAR MEDICINE | Facility: HOSPITAL | Age: 71
Discharge: HOME | End: 2021-10-08

## 2021-10-08 DIAGNOSIS — R00.2 PALPITATIONS: ICD-10-CM

## 2021-10-08 DIAGNOSIS — R07.2 PRECORDIAL PAIN: ICD-10-CM

## 2021-10-08 DIAGNOSIS — R06.02 SOB (SHORTNESS OF BREATH): ICD-10-CM

## 2021-10-08 LAB
BH CV NUCLEAR PRIOR STUDY: 3
BH CV REST NUCLEAR ISOTOPE DOSE: 10.9 MCI
BH CV STRESS BP STAGE 1: NORMAL
BH CV STRESS COMMENTS STAGE 1: NORMAL
BH CV STRESS DOSE REGADENOSON STAGE 1: 0.4
BH CV STRESS DURATION MIN STAGE 1: 0
BH CV STRESS DURATION SEC STAGE 1: 30
BH CV STRESS HR STAGE 1: 65
BH CV STRESS NUCLEAR ISOTOPE DOSE: 33.2 MCI
BH CV STRESS O2 STAGE 1: 97
BH CV STRESS PROTOCOL 1: NORMAL
BH CV STRESS RECOVERY BP: NORMAL MMHG
BH CV STRESS RECOVERY HR: 83 BPM
BH CV STRESS RECOVERY O2: 96 %
BH CV STRESS STAGE 1: 1
MAXIMAL PREDICTED HEART RATE: 147 BPM
PERCENT MAX PREDICTED HR: 61.22 %
STRESS BASELINE BP: NORMAL MMHG
STRESS BASELINE HR: 63 BPM
STRESS O2 SAT REST: 97 %
STRESS PERCENT HR: 72 %
STRESS POST ESTIMATED WORKLOAD: 1 METS
STRESS POST EXERCISE DUR SEC: 30 SEC
STRESS POST O2 SAT PEAK: 99 %
STRESS POST PEAK BP: NORMAL MMHG
STRESS POST PEAK HR: 90 BPM
STRESS TARGET HR: 125 BPM

## 2021-10-08 PROCEDURE — 93018 CV STRESS TEST I&R ONLY: CPT | Performed by: INTERNAL MEDICINE

## 2021-10-08 PROCEDURE — A9500 TC99M SESTAMIBI: HCPCS | Performed by: INTERNAL MEDICINE

## 2021-10-08 PROCEDURE — 78452 HT MUSCLE IMAGE SPECT MULT: CPT | Performed by: INTERNAL MEDICINE

## 2021-10-08 PROCEDURE — 93017 CV STRESS TEST TRACING ONLY: CPT

## 2021-10-08 PROCEDURE — 78452 HT MUSCLE IMAGE SPECT MULT: CPT

## 2021-10-08 PROCEDURE — 93016 CV STRESS TEST SUPVJ ONLY: CPT | Performed by: INTERNAL MEDICINE

## 2021-10-08 PROCEDURE — 0 TECHNETIUM SESTAMIBI: Performed by: INTERNAL MEDICINE

## 2021-10-08 PROCEDURE — 25010000002 REGADENOSON 0.4 MG/5ML SOLUTION: Performed by: INTERNAL MEDICINE

## 2021-10-08 RX ADMIN — TECHNETIUM TC 99M SESTAMIBI 1 DOSE: 1 INJECTION INTRAVENOUS at 09:49

## 2021-10-08 RX ADMIN — REGADENOSON 0.4 MG: 0.08 INJECTION, SOLUTION INTRAVENOUS at 09:49

## 2021-10-08 RX ADMIN — TECHNETIUM TC 99M SESTAMIBI 1 DOSE: 1 INJECTION INTRAVENOUS at 08:13

## 2021-10-11 ENCOUNTER — TELEPHONE (OUTPATIENT)
Dept: CARDIOLOGY | Facility: CLINIC | Age: 71
End: 2021-10-11

## 2021-10-11 NOTE — TELEPHONE ENCOUNTER
Attempted to call patient. No answer and no option to leave a VM. Will continue to try to reach her.    Thank you,    Cori Avina, RN  Triage [facility] [place]

## 2021-10-11 NOTE — TELEPHONE ENCOUNTER
----- Message from Jean-Claude Webber III, MD sent at 10/8/2021  3:45 PM EDT -----  Please call- normal results

## 2021-10-15 ENCOUNTER — HOSPITAL ENCOUNTER (OUTPATIENT)
Dept: CARDIOLOGY | Facility: HOSPITAL | Age: 71
Discharge: HOME | End: 2021-10-15

## 2021-10-15 VITALS
BODY MASS INDEX: 34.67 KG/M2 | HEIGHT: 59 IN | SYSTOLIC BLOOD PRESSURE: 184 MMHG | HEART RATE: 93 BPM | DIASTOLIC BLOOD PRESSURE: 60 MMHG | WEIGHT: 171.96 LBS

## 2021-10-15 DIAGNOSIS — R06.02 SOB (SHORTNESS OF BREATH): ICD-10-CM

## 2021-10-15 DIAGNOSIS — R07.2 PRECORDIAL PAIN: ICD-10-CM

## 2021-10-15 DIAGNOSIS — R00.2 PALPITATIONS: ICD-10-CM

## 2021-10-15 LAB
BH CV ECHO LEFT VENTRICLE BASAL CAVITARY GRADIENT: 11 MMHG
BH CV ECHO MEAS - AO MAX PG (FULL): 6 MMHG
BH CV ECHO MEAS - AO MAX PG: 12.7 MMHG
BH CV ECHO MEAS - AO MEAN PG (FULL): 4 MMHG
BH CV ECHO MEAS - AO MEAN PG: 7 MMHG
BH CV ECHO MEAS - AO ROOT AREA (BSA CORRECTED): 1.5
BH CV ECHO MEAS - AO ROOT AREA: 5.3 CM^2
BH CV ECHO MEAS - AO ROOT DIAM: 2.6 CM
BH CV ECHO MEAS - AO V2 MAX: 178 CM/SEC
BH CV ECHO MEAS - AO V2 MEAN: 129 CM/SEC
BH CV ECHO MEAS - AO V2 VTI: 34.1 CM
BH CV ECHO MEAS - ASC AORTA: 2.8 CM
BH CV ECHO MEAS - AVA(I,A): 1.8 CM^2
BH CV ECHO MEAS - AVA(I,D): 1.8 CM^2
BH CV ECHO MEAS - AVA(V,A): 1.8 CM^2
BH CV ECHO MEAS - AVA(V,D): 1.8 CM^2
BH CV ECHO MEAS - BSA(HAYCOCK): 1.8 M^2
BH CV ECHO MEAS - BSA: 1.7 M^2
BH CV ECHO MEAS - BZI_BMI: 35.1 KILOGRAMS/M^2
BH CV ECHO MEAS - BZI_METRIC_HEIGHT: 149 CM
BH CV ECHO MEAS - BZI_METRIC_WEIGHT: 78 KG
BH CV ECHO MEAS - EDV(CUBED): 47.4 ML
BH CV ECHO MEAS - EDV(MOD-SP2): 68.1 ML
BH CV ECHO MEAS - EDV(MOD-SP4): 44.8 ML
BH CV ECHO MEAS - EDV(TEICH): 55.2 ML
BH CV ECHO MEAS - EF(CUBED): 76.6 %
BH CV ECHO MEAS - EF(MOD-BP): 80.6 %
BH CV ECHO MEAS - EF(MOD-SP2): 81.1 %
BH CV ECHO MEAS - EF(MOD-SP4): 82.2 %
BH CV ECHO MEAS - EF(TEICH): 69.6 %
BH CV ECHO MEAS - ESV(CUBED): 11.1 ML
BH CV ECHO MEAS - ESV(MOD-SP2): 12.9 ML
BH CV ECHO MEAS - ESV(MOD-SP4): 8 ML
BH CV ECHO MEAS - ESV(TEICH): 16.8 ML
BH CV ECHO MEAS - FS: 38.4 %
BH CV ECHO MEAS - IVS/LVPW: 1.4
BH CV ECHO MEAS - IVSD: 1.5 CM
BH CV ECHO MEAS - LAT PEAK E' VEL: 8.6 CM/SEC
BH CV ECHO MEAS - LV DIASTOLIC VOL/BSA (35-75): 26 ML/M^2
BH CV ECHO MEAS - LV MASS(C)D: 149.1 GRAMS
BH CV ECHO MEAS - LV MASS(C)DI: 86.6 GRAMS/M^2
BH CV ECHO MEAS - LV MAX PG: 6.7 MMHG
BH CV ECHO MEAS - LV MEAN PG: 3 MMHG
BH CV ECHO MEAS - LV SYSTOLIC VOL/BSA (12-30): 4.6 ML/M^2
BH CV ECHO MEAS - LV V1 MAX: 129 CM/SEC
BH CV ECHO MEAS - LV V1 MEAN: 78.6 CM/SEC
BH CV ECHO MEAS - LV V1 VTI: 23.7 CM
BH CV ECHO MEAS - LVIDD: 3.6 CM
BH CV ECHO MEAS - LVIDS: 2.2 CM
BH CV ECHO MEAS - LVLD AP2: 7.2 CM
BH CV ECHO MEAS - LVLD AP4: 6.8 CM
BH CV ECHO MEAS - LVLS AP2: 6 CM
BH CV ECHO MEAS - LVLS AP4: 5 CM
BH CV ECHO MEAS - LVOT AREA (M): 2.5 CM^2
BH CV ECHO MEAS - LVOT AREA: 2.5 CM^2
BH CV ECHO MEAS - LVOT DIAM: 1.8 CM
BH CV ECHO MEAS - LVPWD: 1 CM
BH CV ECHO MEAS - MED PEAK E' VEL: 5.9 CM/SEC
BH CV ECHO MEAS - MV A MAX VEL: 105 CM/SEC
BH CV ECHO MEAS - MV DEC SLOPE: 416 CM/SEC^2
BH CV ECHO MEAS - MV DEC TIME: 267 SEC
BH CV ECHO MEAS - MV E MAX VEL: 80.8 CM/SEC
BH CV ECHO MEAS - MV E/A: 0.77
BH CV ECHO MEAS - MV MEAN PG: 2 MMHG
BH CV ECHO MEAS - MV P1/2T MAX VEL: 98.9 CM/SEC
BH CV ECHO MEAS - MV P1/2T: 69.6 MSEC
BH CV ECHO MEAS - MV V2 MEAN: 60.2 CM/SEC
BH CV ECHO MEAS - MV V2 VTI: 26.3 CM
BH CV ECHO MEAS - MVA P1/2T LCG: 2.2 CM^2
BH CV ECHO MEAS - MVA(P1/2T): 3.2 CM^2
BH CV ECHO MEAS - MVA(VTI): 2.3 CM^2
BH CV ECHO MEAS - PA ACC TIME: 0.08 SEC
BH CV ECHO MEAS - PA MAX PG: 8.3 MMHG
BH CV ECHO MEAS - PA PR(ACCEL): 43.5 MMHG
BH CV ECHO MEAS - PA V2 MAX: 144 CM/SEC
BH CV ECHO MEAS - QP/QS: 0.84
BH CV ECHO MEAS - RV MEAN PG: 3 MMHG
BH CV ECHO MEAS - RV V1 MEAN: 73.4 CM/SEC
BH CV ECHO MEAS - RV V1 VTI: 17.8 CM
BH CV ECHO MEAS - RVOT AREA: 2.8 CM^2
BH CV ECHO MEAS - RVOT DIAM: 1.9 CM
BH CV ECHO MEAS - SI(AO): 105.1 ML/M^2
BH CV ECHO MEAS - SI(CUBED): 21.1 ML/M^2
BH CV ECHO MEAS - SI(LVOT): 35 ML/M^2
BH CV ECHO MEAS - SI(MOD-SP2): 32.1 ML/M^2
BH CV ECHO MEAS - SI(MOD-SP4): 21.4 ML/M^2
BH CV ECHO MEAS - SI(TEICH): 22.3 ML/M^2
BH CV ECHO MEAS - SV(AO): 181 ML
BH CV ECHO MEAS - SV(CUBED): 36.3 ML
BH CV ECHO MEAS - SV(LVOT): 60.3 ML
BH CV ECHO MEAS - SV(MOD-SP2): 55.2 ML
BH CV ECHO MEAS - SV(MOD-SP4): 36.8 ML
BH CV ECHO MEAS - SV(RVOT): 50.5 ML
BH CV ECHO MEAS - SV(TEICH): 38.4 ML
BH CV ECHO MEAS - TAPSE (>1.6): 2.4 CM
BH CV ECHO MEASUREMENTS AVERAGE E/E' RATIO: 11.14
BH CV XLRA - RV BASE: 3.2 CM
BH CV XLRA - TDI S': 18.6 CM/SEC
LEFT ATRIUM VOLUME INDEX: 26 ML/M2
MAXIMAL PREDICTED HEART RATE: 147 BPM
STRESS TARGET HR: 125 BPM

## 2021-10-15 PROCEDURE — 93226 XTRNL ECG REC<48 HR SCAN A/R: CPT

## 2021-10-15 PROCEDURE — 93306 TTE W/DOPPLER COMPLETE: CPT

## 2021-10-15 PROCEDURE — 25010000002 PERFLUTREN (DEFINITY) 8.476 MG IN SODIUM CHLORIDE (PF) 0.9 % 10 ML INJECTION: Performed by: FAMILY MEDICINE

## 2021-10-15 PROCEDURE — 93225 XTRNL ECG REC<48 HRS REC: CPT

## 2021-10-15 PROCEDURE — 93306 TTE W/DOPPLER COMPLETE: CPT | Performed by: INTERNAL MEDICINE

## 2021-10-15 RX ADMIN — SODIUM CHLORIDE 2 ML: 9 INJECTION INTRAMUSCULAR; INTRAVENOUS; SUBCUTANEOUS at 14:02

## 2021-10-19 LAB
MAXIMAL PREDICTED HEART RATE: 147 BPM
STRESS TARGET HR: 125 BPM

## 2021-10-19 PROCEDURE — 93227 XTRNL ECG REC<48 HR R&I: CPT | Performed by: INTERNAL MEDICINE

## 2021-10-20 ENCOUNTER — TELEPHONE (OUTPATIENT)
Dept: CARDIOLOGY | Facility: CLINIC | Age: 71
End: 2021-10-20

## 2021-10-20 NOTE — TELEPHONE ENCOUNTER
Attempted to call patient with results.  No answer and no voicemail.  Will continue to try and reach the patient.    Naima Dan RN  [place] [facility] [place]

## 2021-10-21 NOTE — TELEPHONE ENCOUNTER
Attempted to call patient with results.  No answer and no voicemail.  Will continue to try and reach the patient.    Naima Dan RN  Triage [facility] [place]

## 2021-10-22 ENCOUNTER — TELEPHONE (OUTPATIENT)
Dept: CARDIOLOGY | Facility: CLINIC | Age: 71
End: 2021-10-22

## 2021-10-22 NOTE — TELEPHONE ENCOUNTER
----- Message from Jean-Claude Webber III, MD sent at 10/22/2021  1:04 PM EDT -----  Christie-we have not been able to contact this patient, let's see if we can set her up for a telehealth visit over the next week or 2 to review

## 2021-10-22 NOTE — TELEPHONE ENCOUNTER
I spoke to her Daughter. She is schedule for a telephone visit with you on 10/28/21 at 2:45PM to go over her echo results.

## 2021-10-27 ENCOUNTER — OFFICE VISIT (OUTPATIENT)
Dept: CARDIOLOGY | Facility: CLINIC | Age: 71
End: 2021-10-27

## 2021-10-27 VITALS — HEIGHT: 59 IN | BODY MASS INDEX: 33.26 KG/M2 | WEIGHT: 165 LBS

## 2021-10-27 DIAGNOSIS — R06.02 SOB (SHORTNESS OF BREATH): ICD-10-CM

## 2021-10-27 DIAGNOSIS — Z95.5 PRESENCE OF DRUG COATED STENT IN LAD CORONARY ARTERY: ICD-10-CM

## 2021-10-27 DIAGNOSIS — Z78.9 STATIN INTOLERANCE: ICD-10-CM

## 2021-10-27 DIAGNOSIS — I25.118 CORONARY ARTERY DISEASE OF NATIVE ARTERY OF NATIVE HEART WITH STABLE ANGINA PECTORIS (HCC): Primary | ICD-10-CM

## 2021-10-27 DIAGNOSIS — E78.00 HIGH CHOLESTEROL: ICD-10-CM

## 2021-10-27 PROBLEM — Z86.16 HISTORY OF 2019 NOVEL CORONAVIRUS DISEASE (COVID-19): Status: ACTIVE | Noted: 2021-10-27

## 2021-10-27 PROCEDURE — 99442 PR PHYS/QHP TELEPHONE EVALUATION 11-20 MIN: CPT | Performed by: INTERNAL MEDICINE

## 2021-10-27 RX ORDER — ISOSORBIDE MONONITRATE 30 MG/1
30 TABLET, EXTENDED RELEASE ORAL DAILY
Qty: 90 TABLET | Refills: 3 | Status: SHIPPED | OUTPATIENT
Start: 2021-10-27 | End: 2022-09-29

## 2021-10-27 RX ORDER — NITROGLYCERIN 0.4 MG/1
0.4 TABLET SUBLINGUAL
Qty: 25 TABLET | Refills: 5 | Status: SHIPPED | OUTPATIENT
Start: 2021-10-27

## 2021-10-27 RX ORDER — METOPROLOL TARTRATE 50 MG/1
50 TABLET, FILM COATED ORAL 2 TIMES DAILY
COMMUNITY

## 2021-10-27 NOTE — PROGRESS NOTES
Subjective:     Encounter Date:10/27/21      Patient ID: Arianna Smith is a 71 y.o. female.    Chief Complaint:  History of Present Illness    Dear Dr. Horowitz,    I had the pleasure of having a telehealth visit with this patient today.  This was to follow-up on her recent diagnostic testing.    This patient has consented to a telehealth visit via audio. The visit was scheduled as a audio visit to comply with patient safety concerns in accordance with CDC recommendations.  All vitals recorded within this visit are reported by the patient.  I spent  16 minutes in total including but not limited to the 12 minutes spent in direct conversation with this patient.     We saw her recently for the first time and then performed a stress test, echocardiogram, and Holter.  The stress test showed inferior ischemia with normal function.  As noted below she is known to have diffuse disease of the RCA.  No additional ischemia was seen.  Echocardiogram demonstrated mild aortic stenosis with normal LV function.  Holter monitor showed occasional PACs and PVCs but none else.    She continues to have fatigability but no chest pain.  Some shortness of breath with activity.  Sometimes she has some dizziness and lightheadedness and still notes occasional palpitations.    Patient is previously followed with Dr. Cavanaugh, but it looks like 2018 was the last time that she was seen in the office.     She has a history coronary disease.  She had drug-eluting stent placed in the LAD in February 2014.  She had nonocclusive circumflex disease, and diffuse RCA disease that was not amenable to cath-based intervention.  She has been maintained on medical therapy since.  She has not had a stress test, cath, or ischemic evaluation since 2014.  She also has a history of PAD, status post right carotid endarterectomy in 2015 with Dr. Burrows, although she last saw him in 2018.  She has a history of hypertension and severe hyperlipidemia with multiple statin  "intolerance.  She has a history of diabetes mellitus with circulatory complication.    She did have Covid in 2020.  She subsequently had a Covid vaccination.    The following portions of the patient's history were reviewed and updated as appropriate: allergies, current medications, past family history, past medical history, past social history, past surgical history and problem list.    Procedures       Objective:     Vitals:    10/27/21 1344   Weight: 74.8 kg (165 lb)   Height: 149 cm (58.66\")     Body mass index is 33.71 kg/m².     Alert and oriented x3, thought processes normal, judgment normal, speaking in full sentences with no wheezing and no respiratory distress. Hearing is appropriate without difficulty.  Data and records reviewed:     Lab Results   Component Value Date    GLUCOSE 219 (H) 02/24/2017    BUN 22 02/24/2017    CREATININE 1.05 (H) 02/24/2017    EGFRIFNONA 52 (L) 02/24/2017    BCR 21.0 02/24/2017    K 4.1 04/14/2017    CO2 24.7 02/24/2017    CALCIUM 9.2 02/24/2017     No results found for: CHOL  Lab Results   Component Value Date    TRIG 240 (H) 04/19/2018     Lab Results   Component Value Date    HDL 37 (L) 04/19/2018     Lab Results   Component Value Date     (H) 04/19/2018     Lab Results   Component Value Date    VLDL 48 (H) 04/19/2018     No results found for: LDLHDL    No radiology results for the last 90 days.  Results for orders placed during the hospital encounter of 10/15/21    Adult Transthoracic Echo Complete W/ Cont if Necessary Per Protocol    Interpretation Summary  · Left ventricular ejection fraction appears to be greater than 70%. Left ventricular systolic function is hyperdynamic (EF > 70%). Normal left ventricular cavity size and wall thickness noted. All left ventricular wall segments contract normally. Left ventricular intracavitary gradient noted to be 11 mmHg. Left ventricular diastolic function was normal.  · There is moderate calcification of the aortic valve. No " significant aortic valve regurgitation is present. Mild aortic valve stenosis is present.          Assessment:         No diagnosis found.       Plan:       1.  Coronary artery disease, status post drug-eluting stent LAD 2014.  Does have a stable angina pattern, no ischemic evaluation since 2014.  Known to have diffuse RCA disease.  Nuclear perfusion study indicates inferior ischemia.  We will add isosorbide mononitrate 30 mg once daily.  2.  Dyspnea-we will wait to see if this improves with isosorbide  3.  Carotid artery disease, status post carotid endarterectomy, does have bilateral bruits.  I did discuss carotid ultrasound, but the patient is calling to get an appointment with Dr. Burrows who is her vascular surgeon will defer that to himself  4.  Statin intolerance-  5.  Hyperlipidemia, await lab results  6.  Palpitations, rare PACs and PVCs  7.  Mild aortic stenosis    Thank you very much for allowing us to participate in the care of this pleasant patient.  Please don't hesitate to call if I can be of assistance in any way.      Current Outpatient Medications:   •  clopidogrel (PLAVIX) 75 MG tablet, Take 75 mg by mouth Daily., Disp: , Rfl:   •  furosemide (LASIX) 40 MG tablet, Take 40 mg by mouth Daily., Disp: , Rfl:   •  gabapentin (NEURONTIN) 300 MG capsule, Take 300 mg by mouth 3 (Three) Times a Day., Disp: , Rfl:   •  insulin NPH-insulin regular (humuLIN 70/30,novoLIN 70/30) (70-30) 100 UNIT/ML injection, Inject 96 Units under the skin into the appropriate area as directed., Disp: , Rfl:   •  losartan (COZAAR) 50 MG tablet, Take 50 mg by mouth Daily., Disp: , Rfl:   •  metFORMIN (GLUCOPHAGE) 500 MG tablet, Take 500 mg by mouth 2 (Two) Times a Day., Disp: , Rfl:   •  metoprolol tartrate (LOPRESSOR) 50 MG tablet, Take 50 mg by mouth 2 (Two) Times a Day., Disp: , Rfl:   •  nitroglycerin (NITROSTAT) 0.4 MG SL tablet, Place 1 tablet under the tongue Every 5 (Five) Minutes As Needed for Chest Pain., Disp: 25  tablet, Rfl: 5  •  potassium chloride 10 MEQ CR tablet, Take 10 mEq by mouth 2 (Two) Times a Day., Disp: , Rfl:   •  spironolactone (ALDACTONE) 25 MG tablet, Take 25 mg by mouth 2 (Two) Times a Day., Disp: , Rfl:   •  isosorbide mononitrate (IMDUR) 30 MG 24 hr tablet, Take 1 tablet by mouth Daily., Disp: 90 tablet, Rfl: 3         No follow-ups on file.

## 2022-03-30 ENCOUNTER — TRANSCRIBE ORDERS (OUTPATIENT)
Dept: ADMINISTRATIVE | Facility: HOSPITAL | Age: 72
End: 2022-03-30

## 2022-03-30 DIAGNOSIS — R93.89 ABNORMAL CHEST X-RAY: Primary | ICD-10-CM

## 2022-04-06 ENCOUNTER — HOSPITAL ENCOUNTER (OUTPATIENT)
Dept: CT IMAGING | Facility: HOSPITAL | Age: 72
Discharge: HOME | End: 2022-04-06
Admitting: FAMILY MEDICINE

## 2022-04-06 DIAGNOSIS — R93.89 ABNORMAL CHEST X-RAY: ICD-10-CM

## 2022-04-06 PROCEDURE — 71250 CT THORAX DX C-: CPT

## 2022-09-26 DIAGNOSIS — R06.02 SOB (SHORTNESS OF BREATH): ICD-10-CM

## 2022-09-26 DIAGNOSIS — I25.118 CORONARY ARTERY DISEASE OF NATIVE ARTERY OF NATIVE HEART WITH STABLE ANGINA PECTORIS: ICD-10-CM

## 2022-09-26 DIAGNOSIS — Z78.9 STATIN INTOLERANCE: ICD-10-CM

## 2022-09-26 DIAGNOSIS — Z95.5 PRESENCE OF DRUG COATED STENT IN LAD CORONARY ARTERY: ICD-10-CM

## 2022-09-26 DIAGNOSIS — E78.00 HIGH CHOLESTEROL: ICD-10-CM

## 2022-10-04 RX ORDER — ISOSORBIDE MONONITRATE 30 MG/1
TABLET, EXTENDED RELEASE ORAL
Qty: 15 TABLET | Refills: 0 | Status: SHIPPED | OUTPATIENT
Start: 2022-10-04 | End: 2022-10-27 | Stop reason: SDUPTHER

## 2022-10-27 ENCOUNTER — OFFICE VISIT (OUTPATIENT)
Dept: CARDIOLOGY | Facility: CLINIC | Age: 72
End: 2022-10-27

## 2022-10-27 VITALS
WEIGHT: 161 LBS | DIASTOLIC BLOOD PRESSURE: 74 MMHG | SYSTOLIC BLOOD PRESSURE: 130 MMHG | HEART RATE: 65 BPM | BODY MASS INDEX: 33.8 KG/M2 | OXYGEN SATURATION: 95 % | HEIGHT: 58 IN

## 2022-10-27 DIAGNOSIS — R09.89 BRUIT: ICD-10-CM

## 2022-10-27 DIAGNOSIS — Z98.890 HISTORY OF RIGHT-SIDED CAROTID ENDARTERECTOMY: ICD-10-CM

## 2022-10-27 DIAGNOSIS — E78.00 HIGH CHOLESTEROL: Chronic | ICD-10-CM

## 2022-10-27 DIAGNOSIS — I73.9 PAD (PERIPHERAL ARTERY DISEASE): ICD-10-CM

## 2022-10-27 DIAGNOSIS — I25.118 CORONARY ARTERY DISEASE OF NATIVE ARTERY OF NATIVE HEART WITH STABLE ANGINA PECTORIS: ICD-10-CM

## 2022-10-27 DIAGNOSIS — Z78.9 STATIN INTOLERANCE: ICD-10-CM

## 2022-10-27 DIAGNOSIS — Z95.5 PRESENCE OF DRUG COATED STENT IN LAD CORONARY ARTERY: Primary | Chronic | ICD-10-CM

## 2022-10-27 DIAGNOSIS — R06.02 SOB (SHORTNESS OF BREATH): ICD-10-CM

## 2022-10-27 PROBLEM — I65.22 STENOSIS OF LEFT CAROTID ARTERY: Chronic | Status: ACTIVE | Noted: 2022-10-27

## 2022-10-27 PROCEDURE — 93000 ELECTROCARDIOGRAM COMPLETE: CPT | Performed by: INTERNAL MEDICINE

## 2022-10-27 PROCEDURE — 99214 OFFICE O/P EST MOD 30 MIN: CPT | Performed by: INTERNAL MEDICINE

## 2022-10-27 RX ORDER — ISOSORBIDE MONONITRATE 30 MG/1
30 TABLET, EXTENDED RELEASE ORAL DAILY
Qty: 90 TABLET | Refills: 3 | Status: SHIPPED | OUTPATIENT
Start: 2022-10-27

## 2022-10-27 NOTE — PROGRESS NOTES
Subjective:     Encounter Date:10/27/22      Patient ID: Arianna Smith is a 72 y.o. female.    Chief Complaint:  History of Present Illness    Dear Dr. Horowitz,    I had the pleasure of having a visit with this patient today.    Patient comes in today for follow-up of her cardiac status.  She has stable angina pectoris but is actually been doing quite well with that.  She states she feels good on the medical regimen.  She rarely has any discomfort and only with more strenuous activity.  As soon as she slows down or stops it goes away.  Occasionally a little bit of shortness of breath same time.  No shortness of breath at rest.  No orthopnea or PND.    Also a year ago she was supposed to follow-up with Dr. Burrows, she was overdue for follow-up up with him.  She has a history of carotid artery disease and has had prior right carotid endarterectomy.  She says that after our visit she tried to call but they told her that he no longer is practicing the area she could not find them.  She did not call us, and she has not had any evaluation since.    We saw her recently for the first time and then performed a stress test, echocardiogram, and Holter.  The stress test showed inferior ischemia with normal function.  As noted below she is known to have diffuse disease of the RCA.  No additional ischemia was seen.  Echocardiogram demonstrated mild aortic stenosis with normal LV function.  Holter monitor showed occasional PACs and PVCs but none else.    Patient is previously followed with Dr. Cavanaugh, but it looks like 2018 was the last time that she was seen in the office.     She has a history coronary disease.  She had drug-eluting stent placed in the LAD in February 2014.  She had nonocclusive circumflex disease, and diffuse RCA disease that was not amenable to cath-based intervention.  She has been maintained on medical therapy since.  She has not had a stress test, cath, or ischemic evaluation since 2014.  She also has a  "history of PAD, status post right carotid endarterectomy in 2015 with Dr. Burrows, although she last saw him in 2018.  She has a history of hypertension and severe hyperlipidemia with multiple statin intolerance.  She has a history of diabetes mellitus with circulatory complication.    She did have Covid in 2020.  She subsequently had a Covid vaccination.    The following portions of the patient's history were reviewed and updated as appropriate: allergies, current medications, past family history, past medical history, past social history, past surgical history and problem list.      ECG 12 Lead    Date/Time: 10/27/2022 3:54 PM  Performed by: Jean-Claude Webber III, MD  Authorized by: Jean-Claude Webber III, MD   Comparison: compared with previous ECG   Similar to previous ECG  Rhythm: sinus rhythm  Rate: normal  Conduction: conduction normal  ST Segments: ST segments normal  T Waves: T waves normal  QRS axis: normal  Other: no other findings    Clinical impression: normal ECG               Objective:     Vitals:    10/27/22 1341   BP: 130/74   Pulse: 65   SpO2: 95%   Weight: 73 kg (161 lb)   Height: 147.3 cm (58\")     Body mass index is 33.65 kg/m².        General Appearance:    Alert, cooperative, in no acute distress   Head:    Normocephalic, without obvious abnormality, atraumatic   Eyes:            Lids and lashes normal, conjunctivae and sclerae normal, no   icterus, no pallor, corneas clear, PERRLA   Ears:    Ears appear intact with no abnormalities noted   Throat:   No oral lesions, no thrush, oral mucosa moist   Neck:   No adenopathy, supple, trachea midline, no thyromegaly, left  carotid bruit, no JVD   Back:     No kyphosis present, no scoliosis present, no skin lesions, erythema or scars, no tenderness to percussion or palpation, range of motion normal   Lungs:     Clear to auscultation,respirations regular, even and unlabored    Heart:    Regular rhythm and normal rate, normal S1 and S2, no murmur, no gallop, no " rub, no click   Chest Wall:    No abnormalities observed   Abdomen:     Normal bowel sounds, no masses, no organomegaly, soft        non-tender, non-distended, no guarding, no rebound  tenderness   Extremities:   Moves all extremities well, no edema, no cyanosis, no redness   Pulses:   Pulses palpable and equal bilaterally. Normal radial, carotid, femoral, dorsalis pedis and posterior tibial pulses bilaterally. Normal abdominal aorta   Skin:  Psychiatric:   No bleeding, bruising or rash    Alert and oriented x 3, normal mood and affect       Data and records reviewed:     Lab Results   Component Value Date    GLUCOSE 219 (H) 02/24/2017    BUN 22 02/24/2017    CREATININE 1.05 (H) 02/24/2017    EGFRIFNONA 52 (L) 02/24/2017    BCR 21.0 02/24/2017    K 4.1 04/14/2017    CO2 24.7 02/24/2017    CALCIUM 9.2 02/24/2017     No results found for: CHOL  Lab Results   Component Value Date    TRIG 240 (H) 04/19/2018     Lab Results   Component Value Date    HDL 37 (L) 04/19/2018     Lab Results   Component Value Date     (H) 04/19/2018     Lab Results   Component Value Date    VLDL 48 (H) 04/19/2018     No results found for: LDLHDL    No radiology results for the last 90 days.  Results for orders placed during the hospital encounter of 10/15/21    Adult Transthoracic Echo Complete W/ Cont if Necessary Per Protocol    Interpretation Summary  · Left ventricular ejection fraction appears to be greater than 70%. Left ventricular systolic function is hyperdynamic (EF > 70%). Normal left ventricular cavity size and wall thickness noted. All left ventricular wall segments contract normally. Left ventricular intracavitary gradient noted to be 11 mmHg. Left ventricular diastolic function was normal.  · There is moderate calcification of the aortic valve. No significant aortic valve regurgitation is present. Mild aortic valve stenosis is present.          Assessment:          Diagnosis Plan   1. Presence of drug coated stent in LAD  coronary artery  US Carotid Bilateral LAG    isosorbide mononitrate (IMDUR) 30 MG 24 hr tablet    ECG 12 Lead      2. PAD (peripheral artery disease) (HCC)  US Carotid Bilateral LAG    ECG 12 Lead      3. Coronary artery disease of native artery of native heart with stable angina pectoris (HCC)  US Carotid Bilateral LAG    isosorbide mononitrate (IMDUR) 30 MG 24 hr tablet    ECG 12 Lead      4. High cholesterol  US Carotid Bilateral LAG    isosorbide mononitrate (IMDUR) 30 MG 24 hr tablet    ECG 12 Lead      5. History of right-sided carotid endarterectomy  US Carotid Bilateral LAG    ECG 12 Lead      6. Bruit  US Carotid Bilateral LAG    ECG 12 Lead      7. SOB (shortness of breath)  isosorbide mononitrate (IMDUR) 30 MG 24 hr tablet    ECG 12 Lead      8. Statin intolerance  isosorbide mononitrate (IMDUR) 30 MG 24 hr tablet    ECG 12 Lead             Plan:       1.  Coronary artery disease, status post drug-eluting stent LAD 2014.  Does have a stable angina pattern, no ischemic evaluation since 2014.  Known to have diffuse RCA disease.  Nuclear perfusion study indicates inferior ischemia.  Doing very well on her current medical anti-ischemic therapy  2.  Dyspnea- rare with activity, doing well  3.  Carotid artery disease, status post carotid endarterectomy, does have bilateral bruits.  We saw her last year she was going to follow-up with Dr. Burrows, was past due, but has not seen him since then.  She now says she does not know when she would be in to see him, we will go ahead and arrange for carotid duplex scan  4.  Statin intolerance-  5.  Hyperlipidemia, continue therapy, AST, ALT reviewed  6.  Palpitations, rare PACs and PVCs  7.  Mild aortic stenosis    Thank you very much for allowing us to participate in the care of this pleasant patient.  Please don't hesitate to call if I can be of assistance in any way.      Current Outpatient Medications:   •  clopidogrel (PLAVIX) 75 MG tablet, Take 75 mg by mouth Daily.,  Disp: , Rfl:   •  furosemide (LASIX) 40 MG tablet, Take 40 mg by mouth Daily., Disp: , Rfl:   •  gabapentin (NEURONTIN) 300 MG capsule, Take 300 mg by mouth 3 (Three) Times a Day., Disp: , Rfl:   •  insulin NPH-insulin regular (humuLIN 70/30,novoLIN 70/30) (70-30) 100 UNIT/ML injection, Inject 96 Units under the skin into the appropriate area as directed., Disp: , Rfl:   •  isosorbide mononitrate (IMDUR) 30 MG 24 hr tablet, Take 1 tablet by mouth Daily., Disp: 90 tablet, Rfl: 3  •  losartan (COZAAR) 50 MG tablet, Take 50 mg by mouth Daily., Disp: , Rfl:   •  metFORMIN (GLUCOPHAGE) 500 MG tablet, Take 500 mg by mouth 2 (Two) Times a Day., Disp: , Rfl:   •  metoprolol tartrate (LOPRESSOR) 50 MG tablet, Take 50 mg by mouth 2 (Two) Times a Day., Disp: , Rfl:   •  nitroglycerin (NITROSTAT) 0.4 MG SL tablet, Place 1 tablet under the tongue Every 5 (Five) Minutes As Needed for Chest Pain., Disp: 25 tablet, Rfl: 5  •  potassium chloride 10 MEQ CR tablet, Take 10 mEq by mouth 2 (Two) Times a Day., Disp: , Rfl:   •  spironolactone (ALDACTONE) 25 MG tablet, Take 25 mg by mouth 2 (Two) Times a Day., Disp: , Rfl:          Return in about 1 year (around 10/27/2023).

## 2022-11-10 ENCOUNTER — HOSPITAL ENCOUNTER (OUTPATIENT)
Dept: ULTRASOUND IMAGING | Facility: HOSPITAL | Age: 72
Discharge: HOME | End: 2022-11-10
Admitting: INTERNAL MEDICINE

## 2022-11-10 DIAGNOSIS — I73.9 PAD (PERIPHERAL ARTERY DISEASE): ICD-10-CM

## 2022-11-10 DIAGNOSIS — Z95.5 PRESENCE OF DRUG COATED STENT IN LAD CORONARY ARTERY: Chronic | ICD-10-CM

## 2022-11-10 DIAGNOSIS — Z98.890 HISTORY OF RIGHT-SIDED CAROTID ENDARTERECTOMY: ICD-10-CM

## 2022-11-10 DIAGNOSIS — E78.00 HIGH CHOLESTEROL: Chronic | ICD-10-CM

## 2022-11-10 DIAGNOSIS — I25.118 CORONARY ARTERY DISEASE OF NATIVE ARTERY OF NATIVE HEART WITH STABLE ANGINA PECTORIS: ICD-10-CM

## 2022-11-10 DIAGNOSIS — R09.89 BRUIT: ICD-10-CM

## 2022-11-10 PROCEDURE — 93880 EXTRACRANIAL BILAT STUDY: CPT

## 2022-11-15 NOTE — PROGRESS NOTES
I am covering Dr. Dover's in basket today because he is out of the office.      Moderate carotid disease bilaterally. I will leave for Dr. Webber to review and decide if wants to refer to vascular surgery. She had surgery on the right side before. She does not have a vascular surgeon. Pt given preliminary results.  She takes clopidogrel.  She is not on statin therapy due to intolerance.

## 2022-11-22 DIAGNOSIS — Z98.890 HISTORY OF RIGHT-SIDED CAROTID ENDARTERECTOMY: Primary | ICD-10-CM

## 2022-11-22 DIAGNOSIS — E11.69 TYPE 2 DIABETES MELLITUS WITH OTHER SPECIFIED COMPLICATION, WITHOUT LONG-TERM CURRENT USE OF INSULIN: ICD-10-CM

## 2022-11-22 DIAGNOSIS — I73.9 PAD (PERIPHERAL ARTERY DISEASE): ICD-10-CM

## 2023-02-15 ENCOUNTER — TRANSCRIBE ORDERS (OUTPATIENT)
Dept: ADMINISTRATIVE | Facility: HOSPITAL | Age: 73
End: 2023-02-15
Payer: MEDICARE

## 2023-02-15 DIAGNOSIS — R91.1 PULMONARY NODULE: Primary | ICD-10-CM

## 2023-03-06 ENCOUNTER — HOSPITAL ENCOUNTER (OUTPATIENT)
Dept: CT IMAGING | Facility: HOSPITAL | Age: 73
Discharge: HOME | End: 2023-03-06
Admitting: PHYSICIAN ASSISTANT
Payer: MEDICARE

## 2023-03-06 DIAGNOSIS — R91.1 PULMONARY NODULE: ICD-10-CM

## 2023-03-06 PROCEDURE — 71250 CT THORAX DX C-: CPT

## 2023-11-09 ENCOUNTER — OFFICE VISIT (OUTPATIENT)
Dept: CARDIOLOGY | Facility: CLINIC | Age: 73
End: 2023-11-09
Payer: MEDICARE

## 2023-11-09 VITALS
HEART RATE: 70 BPM | HEIGHT: 58 IN | SYSTOLIC BLOOD PRESSURE: 130 MMHG | BODY MASS INDEX: 31.84 KG/M2 | DIASTOLIC BLOOD PRESSURE: 68 MMHG | WEIGHT: 151.7 LBS

## 2023-11-09 DIAGNOSIS — E78.00 HIGH CHOLESTEROL: Chronic | ICD-10-CM

## 2023-11-09 DIAGNOSIS — I25.118 CORONARY ARTERY DISEASE OF NATIVE ARTERY OF NATIVE HEART WITH STABLE ANGINA PECTORIS: ICD-10-CM

## 2023-11-09 DIAGNOSIS — I65.22 STENOSIS OF LEFT CAROTID ARTERY: Chronic | ICD-10-CM

## 2023-11-09 DIAGNOSIS — Z95.5 PRESENCE OF DRUG COATED STENT IN LAD CORONARY ARTERY: Primary | Chronic | ICD-10-CM

## 2023-11-09 RX ORDER — ALBUTEROL SULFATE 90 UG/1
2 AEROSOL, METERED RESPIRATORY (INHALATION) EVERY 4 HOURS PRN
COMMUNITY
Start: 2023-05-15 | End: 2024-05-14

## 2023-11-09 NOTE — PROGRESS NOTES
Subjective:     Encounter Date:11/09/23      Patient ID: Arianna Smith is a 73 y.o. female.    Chief Complaint:  History of Present Illness    Dear [place],    I had the pleasure of having a visit with this patient today.    Patient comes in today for follow-up of her cardiac status.  Has had stable angina in past but really not this last year. She denies any chest pain, pressure, tightness, squeezing, or heartburn.  She has not experienced any feeling of palpitations, tachycardia or heart racing and no presyncope or syncope.  There has not been any problems with dizziness or lightheadedness.  There has not been any orthopnea or PND, and no problems with lower extremity edema.  She denies any shortness of breath at rest or with activity and has not had any wheezing.  She has continued to perform daily activities of living without any specific problem or change in the level of activity.    Also a year ago she was supposed to follow-up with Dr. Burrows, she was overdue for follow-up up with him.  She has a history of carotid artery disease and has had prior right carotid endarterectomy.  She says that after our visit she tried to call but they told her that he no longer is practicing the area she could not find them.  She did not call us, and she has not had any evaluation since.    We saw her recently for the first time and then performed a stress test, echocardiogram, and Holter.  The stress test showed inferior ischemia with normal function.  As noted below she is known to have diffuse disease of the RCA.  No additional ischemia was seen.  Echocardiogram demonstrated mild aortic stenosis with normal LV function.  Holter monitor showed occasional PACs and PVCs but none else.    Patient was previously followed with Dr. Cavanaugh, but it looks like 2018 was the last time that she was seen in the office.     She has a history coronary disease.  She had drug-eluting stent placed in the LAD in February 2014.  She had  "nonocclusive circumflex disease, and diffuse RCA disease that was not amenable to cath-based intervention.  She has been maintained on medical therapy since.  She has not had a stress test, cath, or ischemic evaluation since 2014.  She also has a history of PAD, status post right carotid endarterectomy in 2015 with Dr. Burrows, although she last saw him in 2018.  She has a history of hypertension and severe hyperlipidemia with multiple statin intolerance.  She has a history of diabetes mellitus with circulatory complication.    The following portions of the patient's history were reviewed and updated as appropriate: allergies, current medications, past family history, past medical history, past social history, past surgical history and problem list.      ECG 12 Lead    Date/Time: 11/9/2023 1:37 PM  Performed by: Jean-Claude Webber III, MD    Authorized by: Jean-Claude Webber III, MD  Comparison: compared with previous ECG   Similar to previous ECG  Rhythm: sinus rhythm  Rate: normal  Conduction: conduction normal  ST Segments: ST segments normal  T Waves: T waves normal  QRS axis: normal  Other: no other findings    Clinical impression: normal ECG             Objective:     Vitals:    11/09/23 1311   BP: 130/68   BP Location: Left arm   Pulse: 70   Weight: 68.8 kg (151 lb 11.2 oz)   Height: 147.3 cm (58\")     Body mass index is 31.71 kg/m².        General Appearance:    Alert, cooperative, in no acute distress   Head:    Normocephalic, without obvious abnormality, atraumatic   Eyes:            Lids and lashes normal, conjunctivae and sclerae normal, no   icterus, no pallor, corneas clear, PERRLA   Ears:    Ears appear intact with no abnormalities noted   Throat:   No oral lesions, no thrush, oral mucosa moist   Neck:   No adenopathy, supple, trachea midline, no thyromegaly, left  carotid bruit, no JVD   Back:     No kyphosis present, no scoliosis present, no skin lesions, erythema or scars, no tenderness to percussion or " "palpation, range of motion normal   Lungs:     Clear to auscultation,respirations regular, even and unlabored    Heart:    Regular rhythm and normal rate, normal S1 and S2, no murmur, no gallop, no rub, no click   Chest Wall:    No abnormalities observed   Abdomen:     Normal bowel sounds, no masses, no organomegaly, soft        non-tender, non-distended, no guarding, no rebound  tenderness   Extremities:   Moves all extremities well, no edema, no cyanosis, no redness   Pulses:   Pulses palpable and equal bilaterally. Normal radial, carotid, femoral, dorsalis pedis and posterior tibial pulses bilaterally. Normal abdominal aorta   Skin:  Psychiatric:   No bleeding, bruising or rash    Alert and oriented x 3, normal mood and affect       Data and records reviewed:     Lab Results   Component Value Date    GLUCOSE 219 (H) 02/24/2017    BUN 22 02/24/2017    CREATININE 1.05 (H) 02/24/2017    EGFRIFNONA 52 (L) 02/24/2017    BCR 21.0 02/24/2017    K 4.1 04/14/2017    CO2 24.7 02/24/2017    CALCIUM 9.2 02/24/2017     No results found for: \"CHOL\"  Lab Results   Component Value Date    TRIG 240 (H) 04/19/2018     Lab Results   Component Value Date    HDL 42 (L) 06/01/2023    HDL 37 (L) 04/19/2018     Lab Results   Component Value Date    LDL 93.8 06/01/2023     (H) 04/19/2018     Lab Results   Component Value Date    VLDL 48 (H) 04/19/2018     No results found for: \"LDLHDL\"  CBC          1/6/2023    16:15   CBC   WBC 8.05       RBC 4.54       Hemoglobin 13.1       Hematocrit 39.3       MCV 86.6       MCH 28.9       MCHC 33.3       RDW 13.3       Platelets 205          Details          This result is from an external source.             No radiology results for the last 90 days.  Results for orders placed during the hospital encounter of 10/15/21    Adult Transthoracic Echo Complete W/ Cont if Necessary Per Protocol    Interpretation Summary  · Left ventricular ejection fraction appears to be greater than 70%. Left " ventricular systolic function is hyperdynamic (EF > 70%). Normal left ventricular cavity size and wall thickness noted. All left ventricular wall segments contract normally. Left ventricular intracavitary gradient noted to be 11 mmHg. Left ventricular diastolic function was normal.  · There is moderate calcification of the aortic valve. No significant aortic valve regurgitation is present. Mild aortic valve stenosis is present.          Assessment:          Diagnosis Plan   1. Presence of drug coated stent in LAD coronary artery  ECG 12 Lead      2. Stenosis of left carotid artery  ECG 12 Lead      3. High cholesterol  ECG 12 Lead      4. Coronary artery disease of native artery of native heart with stable angina pectoris  ECG 12 Lead               Plan:       1.  Coronary artery disease, status post drug-eluting stent LAD 2014.  Does have a stable angina pattern, no ischemic evaluation since 2014.  Known to have diffuse RCA disease.  Nuclear perfusion study indicates inferior ischemia.  Doing very well on her current medical anti-ischemic therapy  2.  Dyspnea- rare with activity, doing well  3.  Carotid artery disease, status post carotid endarterectomy, has followed with Dr. Burrows. Carotid duplex 11/11/2022 reviewed.  4.  Statin intolerance-  5.  Hyperlipidemia, continue therapy, AST, ALT reviewed  6.  Palpitations, rare PACs and PVCs  7.  Mild aortic stenosis    Thank you very much for allowing us to participate in the care of this pleasant patient.  Please don't hesitate to call if I can be of assistance in any way.      Current Outpatient Medications:     albuterol sulfate  (90 Base) MCG/ACT inhaler, Inhale 2 puffs Every 4 (Four) Hours As Needed for Shortness of Air., Disp: , Rfl:     clopidogrel (PLAVIX) 75 MG tablet, Take 1 tablet by mouth Daily., Disp: , Rfl:     furosemide (LASIX) 40 MG tablet, Take 1 tablet by mouth Daily., Disp: , Rfl:     gabapentin (NEURONTIN) 300 MG capsule, Take 1 capsule by  mouth 3 (Three) Times a Day., Disp: , Rfl:     insulin NPH-insulin regular (humuLIN 70/30,novoLIN 70/30) (70-30) 100 UNIT/ML injection, Inject 96 Units under the skin into the appropriate area as directed., Disp: , Rfl:     isosorbide mononitrate (IMDUR) 30 MG 24 hr tablet, Take 1 tablet by mouth Daily., Disp: 90 tablet, Rfl: 3    losartan (COZAAR) 50 MG tablet, Take 1 tablet by mouth Daily., Disp: , Rfl:     metFORMIN (GLUCOPHAGE) 500 MG tablet, Take 1 tablet by mouth 2 (Two) Times a Day., Disp: , Rfl:     metoprolol tartrate (LOPRESSOR) 50 MG tablet, Take 1 tablet by mouth 2 (Two) Times a Day., Disp: , Rfl:     nitroglycerin (NITROSTAT) 0.4 MG SL tablet, Place 1 tablet under the tongue Every 5 (Five) Minutes As Needed for Chest Pain., Disp: 25 tablet, Rfl: 5    potassium chloride 10 MEQ CR tablet, Take 1 tablet by mouth 2 (Two) Times a Day., Disp: , Rfl:     spironolactone (ALDACTONE) 25 MG tablet, Take 1 tablet by mouth 2 (Two) Times a Day., Disp: , Rfl:          No follow-ups on file.

## 2025-01-29 ENCOUNTER — TRANSCRIBE ORDERS (OUTPATIENT)
Dept: ADMINISTRATIVE | Facility: HOSPITAL | Age: 75
End: 2025-01-29
Payer: MEDICARE

## 2025-01-29 ENCOUNTER — TELEPHONE (OUTPATIENT)
Dept: NEUROLOGY | Facility: CLINIC | Age: 75
End: 2025-01-29
Payer: MEDICARE

## 2025-01-29 DIAGNOSIS — Z12.31 ENCOUNTER FOR SCREENING MAMMOGRAM FOR MALIGNANT NEOPLASM OF BREAST: Primary | ICD-10-CM

## 2025-01-29 DIAGNOSIS — Z78.0 MENOPAUSE PRESENT: ICD-10-CM

## 2025-01-29 NOTE — TELEPHONE ENCOUNTER
01/29/2025 Called [facility] to request last office notes to be faxed to the office  [phone] Kaelyn

## 2025-03-17 ENCOUNTER — TELEPHONE (OUTPATIENT)
Dept: NEUROLOGY | Facility: CLINIC | Age: 75
End: 2025-03-17
Payer: MEDICARE

## 2025-03-17 NOTE — TELEPHONE ENCOUNTER
03/17/2025 LM to call the office to schedule an appointment for Memory Impairment referral from Concepcion Fry

## 2025-06-26 ENCOUNTER — OFFICE VISIT (OUTPATIENT)
Dept: NEUROLOGY | Facility: CLINIC | Age: 75
End: 2025-06-26
Payer: MEDICARE

## 2025-06-26 VITALS
HEART RATE: 76 BPM | SYSTOLIC BLOOD PRESSURE: 110 MMHG | DIASTOLIC BLOOD PRESSURE: 52 MMHG | BODY MASS INDEX: 29.72 KG/M2 | WEIGHT: 142.2 LBS | OXYGEN SATURATION: 97 %

## 2025-06-26 DIAGNOSIS — R41.3 MEMORY LOSS: Primary | ICD-10-CM

## 2025-06-26 PROCEDURE — 1160F RVW MEDS BY RX/DR IN RCRD: CPT | Performed by: PSYCHIATRY & NEUROLOGY

## 2025-06-26 PROCEDURE — 99204 OFFICE O/P NEW MOD 45 MIN: CPT | Performed by: PSYCHIATRY & NEUROLOGY

## 2025-06-26 PROCEDURE — 1159F MED LIST DOCD IN RCRD: CPT | Performed by: PSYCHIATRY & NEUROLOGY

## 2025-06-26 NOTE — PROGRESS NOTES
Notes by CMA:  Ms Smith is here with her daughter, Arline philip as a referral from HAYLIE Green. She verifies consent that we can speak with her daughter concerning her medical care.      Subjective:     Patient ID: Arianna Smith is a 75 y.o. female.    Memory Loss  Symptoms: headaches    Symptoms: no abdominal pain, no chest pain, no fatigue, no myalgias, no nausea, no neck pain, no numbness, no vomiting and no weakness      The following portions of the patient's history were reviewed and updated as appropriate: allergies, past family history, past medical history, past social history, past surgical history, and problem list.    History of Present Illness  The patient is a 74-year-old woman who presents for evaluation of memory loss.     She reports experiencing headaches, which she believes are associated with her memory lapses. These episodes leave her feeling disoriented and unable to recall routine information. She has been grappling with these headaches for several years, noting a significant increase in their frequency following the death of her .  Her B12 is unremarkable and her thyroid panel was unremarkable.  She lives alone.  She has a history of coronary artery disease status post coronary stents.  She has hypertension and is status post right carotid endarterectomy.  She feels like she sleeps reasonably well intermittently having trouble sleeping but does not snore.  She does not use alcohol does not smoke but does have a little bit of hearing loss.  She sometimes forgets her medications and sometimes where she put money but otherwise is functionally independent.    PAST MEDICAL HISTORY: She has a history of carotid surgery performed several years ago and has two stents placed in her heart.    PAST SURGICAL HISTORY: Carotid surgery performed several years ago.    FAMILY HISTORY  - Mother: Heart trouble  - Negative for heart issues in father    Review of Systems   Constitutional:  Negative  for activity change, appetite change and fatigue.   HENT:  Negative for facial swelling, trouble swallowing and voice change.    Eyes:  Negative for photophobia, pain and visual disturbance.   Respiratory:  Negative for chest tightness, shortness of breath and wheezing.    Cardiovascular:  Negative for chest pain, palpitations and leg swelling.   Gastrointestinal:  Negative for abdominal pain, nausea and vomiting.   Endocrine: Negative for cold intolerance and heat intolerance.   Musculoskeletal:  Positive for arthralgias. Negative for back pain, gait problem, joint swelling, myalgias, neck pain and neck stiffness.   Neurological:  Positive for headaches. Negative for dizziness, tremors, seizures, syncope, facial asymmetry, speech difficulty, weakness, light-headedness and numbness.   Hematological:  Does not bruise/bleed easily.   Psychiatric/Behavioral:  Positive for confusion and decreased concentration. Negative for agitation, behavioral problems, dysphoric mood, hallucinations, self-injury, sleep disturbance and suicidal ideas. The patient is not nervous/anxious and is not hyperactive.         Objective:    Neurological Exam   Awake alert pleasant cooperative with fluent speech and normal speech comprehension.  Her MoCA score today is 20 out of 30 with primary deficits in recall.    Cranial nerves II through XII normal and symmetric.    Motor exam reveals normal symmetric tone bulk and power throughout without drift or spasticity.  No adventitious movements.    The sensory exam reveals normal and symmetric sensation to light touch, temperature, vibration throughout.    Coordination testing reveals smooth and accurate finger-nose-finger bilaterally, normal heel-to-shin bilaterally and normal rhythmic rapid alternating movements.  Romberg testing is negative.    Tendon reflexes are 1+ and symmetric throughout but with absent ankle jerks bilaterally.  No ankle clonus.    Physical Exam    Assessment/Plan:      Diagnoses and all orders for this visit:    1. Memory loss (Primary)  -     MRI Brain Without Contrast; Future  -     Comprehensive Metabolic Panel; Future  -     RPR; Future         Assessment & Plan  1. Memory loss.  75-year-old woman with several years of cognitive issues, primarily involving memory.  Her cognitive screens do suggest mild cognitive impairment or mild dementia although she remains functionally independent at this point, arguing for the former.  I discussed this with her at length.  I suggested we move ahead to complete her workup for modifiable causes.  Laboratory tests and a brain MRI have been ordered to investigate potential underlying causes. The results will guide the next steps in treatment or further diagnostic evaluation.  Thank you very much for the opportunity to participate in her care.    Patient or patient representative verbalized consent for the use of Ambient Listening during the visit with  Graham Garcias MD for chart documentation. 7/17/2025  15:23 EDT

## 2025-07-29 ENCOUNTER — HOSPITAL ENCOUNTER (OUTPATIENT)
Dept: MRI IMAGING | Facility: HOSPITAL | Age: 75
Discharge: HOME | End: 2025-07-29
Admitting: PSYCHIATRY & NEUROLOGY
Payer: MEDICARE

## 2025-07-29 DIAGNOSIS — R41.3 MEMORY LOSS: ICD-10-CM

## 2025-07-29 PROCEDURE — 70551 MRI BRAIN STEM W/O DYE: CPT

## 2025-08-01 ENCOUNTER — RESULTS FOLLOW-UP (OUTPATIENT)
Dept: NEUROLOGY | Facility: CLINIC | Age: 75
End: 2025-08-01
Payer: MEDICARE

## 2025-08-01 ENCOUNTER — LAB (OUTPATIENT)
Dept: LAB | Facility: HOSPITAL | Age: 75
End: 2025-08-01
Payer: MEDICARE

## 2025-08-01 ENCOUNTER — TELEPHONE (OUTPATIENT)
Dept: NEUROLOGY | Facility: CLINIC | Age: 75
End: 2025-08-01
Payer: MEDICARE

## 2025-08-01 DIAGNOSIS — R41.3 MEMORY LOSS: ICD-10-CM

## 2025-08-01 LAB
ALBUMIN SERPL-MCNC: 4.2 G/DL (ref 3.5–5.2)
ALBUMIN/GLOB SERPL: 1.4 G/DL
ALP SERPL-CCNC: 108 U/L (ref 39–117)
ALT SERPL W P-5'-P-CCNC: 11 U/L (ref 1–33)
ANION GAP SERPL CALCULATED.3IONS-SCNC: 13.6 MMOL/L (ref 5–15)
AST SERPL-CCNC: 15 U/L (ref 1–32)
BILIRUB SERPL-MCNC: <0.2 MG/DL (ref 0–1.2)
BUN SERPL-MCNC: 30 MG/DL (ref 8–23)
BUN/CREAT SERPL: 19.9 (ref 7–25)
CALCIUM SPEC-SCNC: 9.5 MG/DL (ref 8.6–10.5)
CHLORIDE SERPL-SCNC: 95 MMOL/L (ref 98–107)
CO2 SERPL-SCNC: 28.4 MMOL/L (ref 22–29)
CREAT SERPL-MCNC: 1.51 MG/DL (ref 0.57–1)
EGFRCR SERPLBLD CKD-EPI 2021: 35.5 ML/MIN/1.73
GLOBULIN UR ELPH-MCNC: 3.1 GM/DL
GLUCOSE SERPL-MCNC: 109 MG/DL (ref 65–99)
POTASSIUM SERPL-SCNC: 4.3 MMOL/L (ref 3.5–5.2)
PROT SERPL-MCNC: 7.3 G/DL (ref 6–8.5)
SODIUM SERPL-SCNC: 137 MMOL/L (ref 136–145)

## 2025-08-01 PROCEDURE — 36415 COLL VENOUS BLD VENIPUNCTURE: CPT

## 2025-08-01 PROCEDURE — 86592 SYPHILIS TEST NON-TREP QUAL: CPT

## 2025-08-01 PROCEDURE — 80053 COMPREHEN METABOLIC PANEL: CPT

## 2025-08-01 NOTE — TELEPHONE ENCOUNTER
Called and advised that the labs need to be completed before patient has her next appointment on Tuesday

## 2025-08-01 NOTE — TELEPHONE ENCOUNTER
Zenon w/Adriana-verified verbal release:  Brain MRI looks okay. No intervention necessary. Requested follow up appointment be moved up. Reschdeuled for 8/5/25

## 2025-08-02 LAB — RPR SER QL: NORMAL

## 2025-08-05 ENCOUNTER — OFFICE VISIT (OUTPATIENT)
Dept: NEUROLOGY | Facility: CLINIC | Age: 75
End: 2025-08-05
Payer: MEDICARE

## 2025-08-05 VITALS
HEART RATE: 79 BPM | DIASTOLIC BLOOD PRESSURE: 62 MMHG | WEIGHT: 142.8 LBS | SYSTOLIC BLOOD PRESSURE: 118 MMHG | OXYGEN SATURATION: 96 % | BODY MASS INDEX: 29.85 KG/M2

## 2025-08-05 DIAGNOSIS — R41.3 MEMORY LOSS: Primary | ICD-10-CM

## (undated) DEVICE — DRSNG TELFA PAD NONADH STR 1S 3X8IN

## (undated) DEVICE — ELECTRD ROLL BALL 24F 5MM

## (undated) DEVICE — GLV SURG NEOLON 2G PF LF 7.5 STRL

## (undated) DEVICE — PAD GRND REM POLYHESIVE A/ DISP

## (undated) DEVICE — BOWL PLSTC LG 32OZ BLU STRL

## (undated) DEVICE — CONTAINER,SPECIMEN,OR STERILE,4OZ: Brand: MEDLINE

## (undated) DEVICE — PK CYSTO 90

## (undated) DEVICE — ELECTRD LP CUT 24/26F YEL

## (undated) DEVICE — TUBING, SUCTION, 1/4" X 12', STRAIGHT: Brand: MEDLINE

## (undated) DEVICE — TRANSPOSAL ULTRAFLEX DUO/QUAD ULTRA CART MANIFOLD

## (undated) DEVICE — IRRIGATOR TOOMEY 70CC